# Patient Record
Sex: FEMALE | Race: OTHER | HISPANIC OR LATINO | URBAN - METROPOLITAN AREA
[De-identification: names, ages, dates, MRNs, and addresses within clinical notes are randomized per-mention and may not be internally consistent; named-entity substitution may affect disease eponyms.]

---

## 2016-12-20 RX ORDER — ESTROGENS, CONJUGATED 0.625 MG/G
0 CREAM WITH APPLICATOR VAGINAL
Qty: 30 | Refills: 0 | COMMUNITY
Start: 2016-12-20

## 2017-03-16 RX ORDER — FLUTICASONE PROPIONATE 50 MCG
1 SPRAY, SUSPENSION NASAL
Qty: 16 | Refills: 0 | COMMUNITY
Start: 2017-03-16

## 2017-03-16 RX ORDER — FLUTICASONE PROPIONATE 50 MCG
0 SPRAY, SUSPENSION NASAL
Qty: 16 | Refills: 0 | COMMUNITY
Start: 2017-03-16

## 2017-03-29 RX ORDER — FAMOTIDINE 10 MG/ML
1 INJECTION INTRAVENOUS
Qty: 12 | Refills: 0 | COMMUNITY
Start: 2017-03-29

## 2017-03-29 RX ORDER — FAMOTIDINE 10 MG/ML
0 INJECTION INTRAVENOUS
Qty: 12 | Refills: 0 | COMMUNITY
Start: 2017-03-29

## 2017-04-07 RX ORDER — VALSARTAN 80 MG/1
1 TABLET ORAL
Qty: 180 | Refills: 0 | COMMUNITY
Start: 2017-04-07

## 2017-04-07 RX ORDER — FLUTICASONE PROPIONATE AND SALMETEROL 50; 250 UG/1; UG/1
0 POWDER ORAL; RESPIRATORY (INHALATION)
Qty: 180 | Refills: 0 | COMMUNITY
Start: 2017-04-07

## 2017-04-07 RX ORDER — VALSARTAN 80 MG/1
0 TABLET ORAL
Qty: 180 | Refills: 0 | COMMUNITY
Start: 2017-04-07

## 2017-04-07 RX ORDER — FLUTICASONE PROPIONATE AND SALMETEROL 50; 250 UG/1; UG/1
1 POWDER ORAL; RESPIRATORY (INHALATION)
Qty: 180 | Refills: 0 | COMMUNITY
Start: 2017-04-07

## 2017-04-25 RX ORDER — ALBUTEROL 90 UG/1
0 AEROSOL, METERED ORAL
Qty: 75 | Refills: 0 | COMMUNITY
Start: 2017-04-25

## 2017-04-25 RX ORDER — ALBUTEROL 90 UG/1
3 AEROSOL, METERED ORAL
Qty: 75 | Refills: 0 | COMMUNITY
Start: 2017-04-25

## 2017-04-25 RX ORDER — TRIAMTERENE/HYDROCHLOROTHIAZID 75 MG-50MG
0 TABLET ORAL
Qty: 45 | Refills: 0 | COMMUNITY
Start: 2017-04-25

## 2017-05-01 RX ORDER — BUDESONIDE AND FORMOTEROL FUMARATE DIHYDRATE 160; 4.5 UG/1; UG/1
0 AEROSOL RESPIRATORY (INHALATION)
Qty: 10.2 | Refills: 0 | COMMUNITY
Start: 2017-05-01

## 2017-05-01 RX ORDER — BUDESONIDE AND FORMOTEROL FUMARATE DIHYDRATE 160; 4.5 UG/1; UG/1
2 AEROSOL RESPIRATORY (INHALATION)
Qty: 10.2 | Refills: 0 | COMMUNITY
Start: 2017-05-01

## 2017-05-01 RX ORDER — AMLODIPINE BESYLATE 2.5 MG/1
1 TABLET ORAL
Qty: 15 | Refills: 0 | COMMUNITY
Start: 2017-05-01

## 2017-05-01 RX ORDER — AMLODIPINE BESYLATE 2.5 MG/1
0 TABLET ORAL
Qty: 15 | Refills: 0 | COMMUNITY
Start: 2017-05-01

## 2017-05-22 ENCOUNTER — INPATIENT (INPATIENT)
Facility: HOSPITAL | Age: 82
LOS: 1 days | Discharge: HOME | DRG: 872 | End: 2017-05-24
Attending: INTERNAL MEDICINE | Admitting: INTERNAL MEDICINE
Payer: MEDICARE

## 2017-05-22 VITALS
DIASTOLIC BLOOD PRESSURE: 86 MMHG | TEMPERATURE: 100 F | OXYGEN SATURATION: 91 % | SYSTOLIC BLOOD PRESSURE: 179 MMHG | RESPIRATION RATE: 24 BRPM | HEART RATE: 98 BPM

## 2017-05-22 PROCEDURE — 99285 EMERGENCY DEPT VISIT HI MDM: CPT | Mod: GC

## 2017-05-22 RX ORDER — IPRATROPIUM/ALBUTEROL SULFATE 18-103MCG
3 AEROSOL WITH ADAPTER (GRAM) INHALATION
Qty: 0 | Refills: 0 | Status: COMPLETED | OUTPATIENT
Start: 2017-05-22 | End: 2017-05-23

## 2017-05-22 NOTE — ED ADULT NURSE NOTE - OBJECTIVE STATEMENT
82 y/o F, PMH HTN, asthma, never has been intubated, presents to ED c/o wet non-productive cough, fevers and SOB x 2 days, with worsening dyspnea tonight that prompted her to present to ED with son. Pt 90% on RA in Triage, brought back to Triage immediately, pt tachypneic, RR 24, respirations labored, pt placed on 4L nasal cannula, respirations improving, appearing less labored at this time. Pt denies headache, dizziness, chest pain, palpitations, abdominal pain, n/v/d, urinary symptoms at this time. Pt febrile 102.5F rectally, MD aware, pt last took Tylenol at 1600, pt medicated as ordered. Safety maintained. 82 y/o F, PMH HTN, asthma, never has been intubated, presents to ED c/o wet non-productive cough, fevers and SOB x 2 days, with worsening dyspnea tonight that prompted her to present to ED with son. Pt 90% on RA in Triage, brought back to Triage immediately, pt tachypneic, RR 24, respirations labored, pt placed on 4L nasal cannula, respirations improving, appearing less labored at this time. Pt denies headache, dizziness, chest pain, palpitations, abdominal pain, n/v/d, urinary symptoms at this time. Pt febrile 102.5F rectally, MD aware, pt last took Tylenol at 1600, pt medicated as ordered. No recent travel, no sick contacts. Safety maintained.

## 2017-05-23 DIAGNOSIS — I10 ESSENTIAL (PRIMARY) HYPERTENSION: ICD-10-CM

## 2017-05-23 DIAGNOSIS — J44.1 CHRONIC OBSTRUCTIVE PULMONARY DISEASE WITH (ACUTE) EXACERBATION: ICD-10-CM

## 2017-05-23 DIAGNOSIS — J18.9 PNEUMONIA, UNSPECIFIED ORGANISM: ICD-10-CM

## 2017-05-23 DIAGNOSIS — J96.91 RESPIRATORY FAILURE, UNSPECIFIED WITH HYPOXIA: ICD-10-CM

## 2017-05-23 DIAGNOSIS — Z29.9 ENCOUNTER FOR PROPHYLACTIC MEASURES, UNSPECIFIED: ICD-10-CM

## 2017-05-23 DIAGNOSIS — A41.9 SEPSIS, UNSPECIFIED ORGANISM: ICD-10-CM

## 2017-05-23 LAB
ALBUMIN SERPL ELPH-MCNC: 4.3 G/DL — SIGNIFICANT CHANGE UP (ref 3.3–5)
ALP SERPL-CCNC: 55 U/L — SIGNIFICANT CHANGE UP (ref 40–120)
ALT FLD-CCNC: 44 U/L RC — SIGNIFICANT CHANGE UP (ref 10–45)
ANION GAP SERPL CALC-SCNC: 14 MMOL/L — SIGNIFICANT CHANGE UP (ref 5–17)
APPEARANCE UR: CLEAR — SIGNIFICANT CHANGE UP
AST SERPL-CCNC: 39 U/L — SIGNIFICANT CHANGE UP (ref 10–40)
BASOPHILS # BLD AUTO: 0.1 K/UL — SIGNIFICANT CHANGE UP (ref 0–0.2)
BASOPHILS NFR BLD AUTO: 0.5 % — SIGNIFICANT CHANGE UP (ref 0–2)
BILIRUB SERPL-MCNC: 0.4 MG/DL — SIGNIFICANT CHANGE UP (ref 0.2–1.2)
BILIRUB UR-MCNC: NEGATIVE — SIGNIFICANT CHANGE UP
BUN SERPL-MCNC: 17 MG/DL — SIGNIFICANT CHANGE UP (ref 7–23)
CALCIUM SERPL-MCNC: 8.8 MG/DL — SIGNIFICANT CHANGE UP (ref 8.4–10.5)
CHLORIDE SERPL-SCNC: 95 MMOL/L — LOW (ref 96–108)
CO2 SERPL-SCNC: 27 MMOL/L — SIGNIFICANT CHANGE UP (ref 22–31)
COLOR SPEC: YELLOW — SIGNIFICANT CHANGE UP
CREAT SERPL-MCNC: 0.72 MG/DL — SIGNIFICANT CHANGE UP (ref 0.5–1.3)
DIFF PNL FLD: NEGATIVE — SIGNIFICANT CHANGE UP
EOSINOPHIL # BLD AUTO: 0 K/UL — SIGNIFICANT CHANGE UP (ref 0–0.5)
EOSINOPHIL NFR BLD AUTO: 0.3 % — SIGNIFICANT CHANGE UP (ref 0–6)
GAS PNL BLDV: SIGNIFICANT CHANGE UP
GLUCOSE SERPL-MCNC: 123 MG/DL — HIGH (ref 70–99)
GLUCOSE UR QL: NEGATIVE MG/DL — SIGNIFICANT CHANGE UP
HCT VFR BLD CALC: 43 % — SIGNIFICANT CHANGE UP (ref 34.5–45)
HGB BLD-MCNC: 14.6 G/DL — SIGNIFICANT CHANGE UP (ref 11.5–15.5)
KETONES UR-MCNC: NEGATIVE — SIGNIFICANT CHANGE UP
LEUKOCYTE ESTERASE UR-ACNC: NEGATIVE — SIGNIFICANT CHANGE UP
LYMPHOCYTES # BLD AUTO: 1.5 K/UL — SIGNIFICANT CHANGE UP (ref 1–3.3)
LYMPHOCYTES # BLD AUTO: 12.9 % — LOW (ref 13–44)
MCHC RBC-ENTMCNC: 33.1 PG — SIGNIFICANT CHANGE UP (ref 27–34)
MCHC RBC-ENTMCNC: 34 GM/DL — SIGNIFICANT CHANGE UP (ref 32–36)
MCV RBC AUTO: 97.4 FL — SIGNIFICANT CHANGE UP (ref 80–100)
MONOCYTES # BLD AUTO: 0.7 K/UL — SIGNIFICANT CHANGE UP (ref 0–0.9)
MONOCYTES NFR BLD AUTO: 6 % — SIGNIFICANT CHANGE UP (ref 2–14)
NEUTROPHILS # BLD AUTO: 9.2 K/UL — HIGH (ref 1.8–7.4)
NEUTROPHILS NFR BLD AUTO: 80.3 % — HIGH (ref 43–77)
NITRITE UR-MCNC: NEGATIVE — SIGNIFICANT CHANGE UP
PH UR: 6.5 — SIGNIFICANT CHANGE UP (ref 5–8)
PLATELET # BLD AUTO: 178 K/UL — SIGNIFICANT CHANGE UP (ref 150–400)
POTASSIUM SERPL-MCNC: 3.7 MMOL/L — SIGNIFICANT CHANGE UP (ref 3.5–5.3)
POTASSIUM SERPL-SCNC: 3.7 MMOL/L — SIGNIFICANT CHANGE UP (ref 3.5–5.3)
PROT SERPL-MCNC: 7.4 G/DL — SIGNIFICANT CHANGE UP (ref 6–8.3)
PROT UR-MCNC: NEGATIVE MG/DL — SIGNIFICANT CHANGE UP
RAPID RVP RESULT: SIGNIFICANT CHANGE UP
RBC # BLD: 4.42 M/UL — SIGNIFICANT CHANGE UP (ref 3.8–5.2)
RBC # FLD: 11.7 % — SIGNIFICANT CHANGE UP (ref 10.3–14.5)
SODIUM SERPL-SCNC: 136 MMOL/L — SIGNIFICANT CHANGE UP (ref 135–145)
SP GR SPEC: 1 — LOW (ref 1.01–1.02)
UROBILINOGEN FLD QL: NEGATIVE MG/DL — SIGNIFICANT CHANGE UP
WBC # BLD: 11.4 K/UL — HIGH (ref 3.8–10.5)
WBC # FLD AUTO: 11.4 K/UL — HIGH (ref 3.8–10.5)

## 2017-05-23 PROCEDURE — 99223 1ST HOSP IP/OBS HIGH 75: CPT | Mod: GC

## 2017-05-23 PROCEDURE — 71010: CPT | Mod: 26

## 2017-05-23 PROCEDURE — 12345: CPT | Mod: GC,NC

## 2017-05-23 RX ORDER — TIOTROPIUM BROMIDE 18 UG/1
1 CAPSULE ORAL; RESPIRATORY (INHALATION) DAILY
Qty: 0 | Refills: 0 | Status: DISCONTINUED | OUTPATIENT
Start: 2017-05-23 | End: 2017-05-23

## 2017-05-23 RX ORDER — ENOXAPARIN SODIUM 100 MG/ML
40 INJECTION SUBCUTANEOUS DAILY
Qty: 0 | Refills: 0 | Status: DISCONTINUED | OUTPATIENT
Start: 2017-05-23 | End: 2017-05-24

## 2017-05-23 RX ORDER — ALBUTEROL 90 UG/1
1 AEROSOL, METERED ORAL EVERY 4 HOURS
Qty: 0 | Refills: 0 | Status: DISCONTINUED | OUTPATIENT
Start: 2017-05-23 | End: 2017-05-23

## 2017-05-23 RX ORDER — HEPARIN SODIUM 5000 [USP'U]/ML
5000 INJECTION INTRAVENOUS; SUBCUTANEOUS EVERY 8 HOURS
Qty: 0 | Refills: 0 | Status: DISCONTINUED | OUTPATIENT
Start: 2017-05-23 | End: 2017-05-23

## 2017-05-23 RX ORDER — ALBUTEROL 90 UG/1
2 AEROSOL, METERED ORAL EVERY 6 HOURS
Qty: 0 | Refills: 0 | Status: DISCONTINUED | OUTPATIENT
Start: 2017-05-23 | End: 2017-05-23

## 2017-05-23 RX ORDER — IPRATROPIUM/ALBUTEROL SULFATE 18-103MCG
3 AEROSOL WITH ADAPTER (GRAM) INHALATION EVERY 6 HOURS
Qty: 0 | Refills: 0 | Status: DISCONTINUED | OUTPATIENT
Start: 2017-05-23 | End: 2017-05-23

## 2017-05-23 RX ORDER — FLUTICASONE PROPIONATE 50 MCG
1 SPRAY, SUSPENSION NASAL DAILY
Qty: 0 | Refills: 0 | Status: DISCONTINUED | OUTPATIENT
Start: 2017-05-23 | End: 2017-05-24

## 2017-05-23 RX ORDER — ACETAMINOPHEN 500 MG
1000 TABLET ORAL ONCE
Qty: 0 | Refills: 0 | Status: COMPLETED | OUTPATIENT
Start: 2017-05-23 | End: 2017-05-23

## 2017-05-23 RX ORDER — IPRATROPIUM/ALBUTEROL SULFATE 18-103MCG
3 AEROSOL WITH ADAPTER (GRAM) INHALATION EVERY 6 HOURS
Qty: 0 | Refills: 0 | Status: DISCONTINUED | OUTPATIENT
Start: 2017-05-23 | End: 2017-05-24

## 2017-05-23 RX ORDER — BUDESONIDE AND FORMOTEROL FUMARATE DIHYDRATE 160; 4.5 UG/1; UG/1
1 AEROSOL RESPIRATORY (INHALATION)
Qty: 0 | Refills: 0 | Status: DISCONTINUED | OUTPATIENT
Start: 2017-05-23 | End: 2017-05-23

## 2017-05-23 RX ADMIN — Medication 40 MILLIGRAM(S): at 05:59

## 2017-05-23 RX ADMIN — Medication 3 MILLILITER(S): at 23:44

## 2017-05-23 RX ADMIN — Medication 3 MILLILITER(S): at 00:00

## 2017-05-23 RX ADMIN — Medication 3 MILLILITER(S): at 15:32

## 2017-05-23 RX ADMIN — Medication 400 MILLIGRAM(S): at 00:06

## 2017-05-23 RX ADMIN — ENOXAPARIN SODIUM 40 MILLIGRAM(S): 100 INJECTION SUBCUTANEOUS at 15:32

## 2017-05-23 RX ADMIN — Medication 0.2 MILLIGRAM(S): at 15:33

## 2017-05-23 RX ADMIN — Medication 125 MILLIGRAM(S): at 00:07

## 2017-05-23 RX ADMIN — Medication 3 MILLILITER(S): at 00:05

## 2017-05-23 RX ADMIN — Medication 3 MILLILITER(S): at 05:59

## 2017-05-23 RX ADMIN — Medication 3 MILLILITER(S): at 00:07

## 2017-05-23 RX ADMIN — Medication 3 MILLILITER(S): at 16:54

## 2017-05-23 RX ADMIN — Medication 40 MILLIGRAM(S): at 15:33

## 2017-05-23 NOTE — H&P ADULT. - NEGATIVE ENMT SYMPTOMS
no throat pain/no sinus symptoms/no dry mouth/no nasal congestion/no dysphagia/no nasal obstruction/no hearing difficulty

## 2017-05-23 NOTE — H&P ADULT. - HISTORY OF PRESENT ILLNESS
80 yo F with pmhx of COPD, Asthma, HTN, presents to ED with 2 day history of cough and shortness of breath. Cough is non productive. Did not check temp, but felt febrile with chills.  Patient was not on any recent antibiotics. Patient is taking Prednisone 20 mg daily at home. She denies any recent sick contacts, no recent travel. No runny nose, watery eyes or sore throat. Patient also denies any diarrhea, constipation, or dysuria.  Patient also mentions she ran out of her inhalers two days ago.  Initially in the ED she had labored breathing and was placed on NC 4L and improved. She was then weaned down to 2L NC, and saturating well.  Patient was febrile in the ED to 102.5 and hypertensive.  She received Solumedrol, Levaquin 500 mg, Tylenol, and duoneb treatments. 82 yo Kazakh speaking F with pmhx of COPD vs Asthma, HTN, presents to ED with cough and 2 day worsening of shortness of breath. Cough is non productive. Did not check temp, but felt febrile with chills.  Patient was not on any recent antibiotics. Patient is taking Prednisone 20 mg daily at home. She denies any recent sick contacts, no recent travel. No runny nose, watery eyes or sore throat. Patient also denies any diarrhea, constipation, or dysuria.  Patient also mentions she ran out of her inhalers two days ago. Pt states she's actually been SOB for a long time, but one of her inhalers didn't work 3 weeks ago.    Initially in the ED she had labored breathing and was placed on NC 4L and improved. She was then weaned down to 2L NC, and saturating well.  Patient was febrile in the ED to 102.5 and hypertensive. She received Solumedrol, Levaquin 500 mg, Tylenol, and duoneb treatments.

## 2017-05-23 NOTE — ED ADULT NURSE REASSESSMENT NOTE - NS ED NURSE REASSESS COMMENT FT1
Pt AAOx3, NAD, resp nonlabored, no tachypnea, resting comfortably in bed. Pt reports improvement in cough and fever. Pt now denies SOB. SpO2 98% on 2L nasal cannula. Pt denies headache, dizziness, chest pain, palpitations, SOB, abd pain, n/v/d, urinary symptoms, fevers, chills, weakness at this time. Pt awaiting bed Safety maintained. Pt AAOx3, NAD, resp nonlabored, no tachypnea, resting comfortably in bed. Pt reports improvement in cough and fever. Pt now denies SOB. SpO2 98% on 2L nasal cannula. Pt 100F rectally, MD Craig made aware, no medicated needed at this time as per MD. ED Holding RN made aware in report. Pt denies headache, dizziness, chest pain, palpitations, SOB, abd pain, n/v/d, urinary symptoms, chills, weakness at this time. Pt awaiting bed Safety maintained.    0325:Report given to ED Holding JESSENIA Dawkins.

## 2017-05-23 NOTE — ED PROVIDER NOTE - MEDICAL DECISION MAKING DETAILS
Dr. Lazo (Attending Physician)  Pt. with ho asthma/copd pw fever, cough, congestion shortness of breath. O2 sat 80s on RA.  on exam wheezing diffusely and tachypneic febrile to 102.5.  Will give nebs, steroids, levofloxacin and check labs, blood cultures, RVP.  ECG.  Do not suspect CHF or heart failure.  If ECG abnormal or CXR reveals evidence of pulm edema will add on BNP and Trop.

## 2017-05-23 NOTE — H&P ADULT. - ASSESSMENT
80 yo F with COPD/Asthma, HTN, who presents for asthma excacerbation 82 yo F with COPD/Asthma, HTN, who presents for asthma excacerbation and sepsis 2/2 presumed CAP.

## 2017-05-23 NOTE — H&P ADULT. - PROBLEM SELECTOR PLAN 3
Heparin sub q ED document says patient had spO2 of 80 on room air.   Continue NC oxygen as needed  Likely 2/2 asthma exacerbation ED document says patient had spO2 of 80 on room air.   Continue NC oxygen as needed  Likely 2/2 asthma exacerbation  - cont NC

## 2017-05-23 NOTE — H&P ADULT. - PROBLEM SELECTOR PLAN 2
Continue PTA Clonidine 0.2 mg bid  Continue PTA Valsartan and other medications as patient's BP starts to come up. - Likely viral  - EKG Unchanged  - Continue Levaquin 500 mg daily for 5 days

## 2017-05-23 NOTE — ED PROVIDER NOTE - ATTENDING CONTRIBUTION TO CARE
Dr. Lazo (Attending Physician)  I performed a history and physical exam of the patient and discussed their management with the resident. I reviewed the resident's note and agree with the documented findings and plan of care. My medical decision making and observations are found above. Dr. Lazo (Attending Physician)  I performed a history and physical exam of the patient and discussed their management with the resident. I reviewed the resident's note and agree with the documented findings and plan of care. My medical decision making and observations are found above..

## 2017-05-23 NOTE — H&P ADULT. - NEUROLOGICAL DETAILS
sensation intact/alert and oriented x 3/normal strength sensation intact/cranial nerves intact/normal strength/responds to verbal commands/alert and oriented x 3

## 2017-05-23 NOTE — ED PROVIDER NOTE - OBJECTIVE STATEMENT
81yF h/o asthma (no prior intubations), former smoker, presents with 2 days of cough, shortness of breath and fever. No recent hospitalizations. No recent travel or sick contacts. No relief with albuterol at home.

## 2017-05-23 NOTE — H&P ADULT. - PROBLEM SELECTOR PLAN 1
Likely secondary to running out of her home inhalers or a viral pneumonia. No evidence this is bacterial with little of productive sputum. May be allergen triggered as well.    Continue Prednisone 40 mg daily for 5 days  Continue Levaquin 500 mg daily for 5 days  Continue duonebs and symbicort Likely secondary to running out of her home inhalers or a viral pneumonia. No evidence this is bacterial with little of productive sputum. May be allergen triggered as well.    Continue Solumedrol 20 mg IV BID  Continue Levaquin 500 mg daily for 5 days  Continue duonebs and symbicort Likely secondary to running out of her home inhalers or pneumonia. No evidence this is bacterial with little of productive sputum. May be allergen triggered as well.    Continue Solumedrol 20 mg IV BID  Continue Levaquin 500 mg daily for 5 days  Continue duonebs and symbicort

## 2017-05-23 NOTE — H&P ADULT. - EKG AND INTERPRETATION
Personally reviewed by me. Patient has unchanged EKG changes from 2015 .  Unchanged ST elevation sin V1,2,4,5  HR 90s; no other ST or T wave changes.

## 2017-05-23 NOTE — H&P ADULT. - RADIOLOGY RESULTS AND INTERPRETATION
Personally reviewed by me. Clear lungs on chest xray Personally reviewed by me. Clear lungs on chest xray. ?hyperinflated.

## 2017-05-23 NOTE — H&P ADULT. - PROBLEM SELECTOR PLAN 5
Continue PTA Clonidine 0.2 mg bid  Restart PTA Valsartan and other medications as patient's BP starts to come up.

## 2017-05-24 VITALS
DIASTOLIC BLOOD PRESSURE: 68 MMHG | HEART RATE: 77 BPM | TEMPERATURE: 99 F | RESPIRATION RATE: 18 BRPM | OXYGEN SATURATION: 92 % | SYSTOLIC BLOOD PRESSURE: 114 MMHG

## 2017-05-24 LAB
ANION GAP SERPL CALC-SCNC: 19 MMOL/L — HIGH (ref 5–17)
BASOPHILS # BLD AUTO: 0 K/UL — SIGNIFICANT CHANGE UP (ref 0–0.2)
BASOPHILS NFR BLD AUTO: 0 % — SIGNIFICANT CHANGE UP (ref 0–2)
BUN SERPL-MCNC: 19 MG/DL — SIGNIFICANT CHANGE UP (ref 7–23)
CALCIUM SERPL-MCNC: 9.1 MG/DL — SIGNIFICANT CHANGE UP (ref 8.4–10.5)
CHLORIDE SERPL-SCNC: 99 MMOL/L — SIGNIFICANT CHANGE UP (ref 96–108)
CO2 SERPL-SCNC: 21 MMOL/L — LOW (ref 22–31)
CREAT SERPL-MCNC: 0.59 MG/DL — SIGNIFICANT CHANGE UP (ref 0.5–1.3)
CULTURE RESULTS: NO GROWTH — SIGNIFICANT CHANGE UP
EOSINOPHIL # BLD AUTO: 0 K/UL — SIGNIFICANT CHANGE UP (ref 0–0.5)
EOSINOPHIL NFR BLD AUTO: 0 % — SIGNIFICANT CHANGE UP (ref 0–6)
GLUCOSE SERPL-MCNC: 110 MG/DL — HIGH (ref 70–99)
HCT VFR BLD CALC: 40.2 % — SIGNIFICANT CHANGE UP (ref 34.5–45)
HGB BLD-MCNC: 13.2 G/DL — SIGNIFICANT CHANGE UP (ref 11.5–15.5)
IMM GRANULOCYTES NFR BLD AUTO: 0.3 % — SIGNIFICANT CHANGE UP (ref 0–1.5)
LYMPHOCYTES # BLD AUTO: 1.23 K/UL — SIGNIFICANT CHANGE UP (ref 1–3.3)
LYMPHOCYTES # BLD AUTO: 10.5 % — LOW (ref 13–44)
MCHC RBC-ENTMCNC: 31 PG — SIGNIFICANT CHANGE UP (ref 27–34)
MCHC RBC-ENTMCNC: 32.8 GM/DL — SIGNIFICANT CHANGE UP (ref 32–36)
MCV RBC AUTO: 94.4 FL — SIGNIFICANT CHANGE UP (ref 80–100)
MONOCYTES # BLD AUTO: 0.5 K/UL — SIGNIFICANT CHANGE UP (ref 0–0.9)
MONOCYTES NFR BLD AUTO: 4.2 % — SIGNIFICANT CHANGE UP (ref 2–14)
NEUTROPHILS # BLD AUTO: 10.01 K/UL — HIGH (ref 1.8–7.4)
NEUTROPHILS NFR BLD AUTO: 85 % — HIGH (ref 43–77)
PLATELET # BLD AUTO: 195 K/UL — SIGNIFICANT CHANGE UP (ref 150–400)
POTASSIUM SERPL-MCNC: 4.5 MMOL/L — SIGNIFICANT CHANGE UP (ref 3.5–5.3)
POTASSIUM SERPL-SCNC: 4.5 MMOL/L — SIGNIFICANT CHANGE UP (ref 3.5–5.3)
RBC # BLD: 4.26 M/UL — SIGNIFICANT CHANGE UP (ref 3.8–5.2)
RBC # FLD: 13.3 % — SIGNIFICANT CHANGE UP (ref 10.3–14.5)
SODIUM SERPL-SCNC: 139 MMOL/L — SIGNIFICANT CHANGE UP (ref 135–145)
SPECIMEN SOURCE: SIGNIFICANT CHANGE UP
WBC # BLD: 11.77 K/UL — HIGH (ref 3.8–10.5)
WBC # FLD AUTO: 11.77 K/UL — HIGH (ref 3.8–10.5)

## 2017-05-24 PROCEDURE — 87798 DETECT AGENT NOS DNA AMP: CPT

## 2017-05-24 PROCEDURE — 84295 ASSAY OF SERUM SODIUM: CPT

## 2017-05-24 PROCEDURE — 87040 BLOOD CULTURE FOR BACTERIA: CPT

## 2017-05-24 PROCEDURE — 84132 ASSAY OF SERUM POTASSIUM: CPT

## 2017-05-24 PROCEDURE — 82803 BLOOD GASES ANY COMBINATION: CPT

## 2017-05-24 PROCEDURE — 87086 URINE CULTURE/COLONY COUNT: CPT

## 2017-05-24 PROCEDURE — 80048 BASIC METABOLIC PNL TOTAL CA: CPT

## 2017-05-24 PROCEDURE — 93005 ELECTROCARDIOGRAM TRACING: CPT

## 2017-05-24 PROCEDURE — 85027 COMPLETE CBC AUTOMATED: CPT

## 2017-05-24 PROCEDURE — 87581 M.PNEUMON DNA AMP PROBE: CPT

## 2017-05-24 PROCEDURE — 83605 ASSAY OF LACTIC ACID: CPT

## 2017-05-24 PROCEDURE — 85014 HEMATOCRIT: CPT

## 2017-05-24 PROCEDURE — 80053 COMPREHEN METABOLIC PANEL: CPT

## 2017-05-24 PROCEDURE — 82435 ASSAY OF BLOOD CHLORIDE: CPT

## 2017-05-24 PROCEDURE — 81003 URINALYSIS AUTO W/O SCOPE: CPT

## 2017-05-24 PROCEDURE — 71045 X-RAY EXAM CHEST 1 VIEW: CPT

## 2017-05-24 PROCEDURE — 99239 HOSP IP/OBS DSCHRG MGMT >30: CPT

## 2017-05-24 PROCEDURE — 87633 RESP VIRUS 12-25 TARGETS: CPT

## 2017-05-24 PROCEDURE — 96375 TX/PRO/DX INJ NEW DRUG ADDON: CPT

## 2017-05-24 PROCEDURE — 87486 CHLMYD PNEUM DNA AMP PROBE: CPT

## 2017-05-24 PROCEDURE — 94640 AIRWAY INHALATION TREATMENT: CPT

## 2017-05-24 PROCEDURE — 96374 THER/PROPH/DIAG INJ IV PUSH: CPT

## 2017-05-24 PROCEDURE — 82947 ASSAY GLUCOSE BLOOD QUANT: CPT

## 2017-05-24 PROCEDURE — 82330 ASSAY OF CALCIUM: CPT

## 2017-05-24 PROCEDURE — 99285 EMERGENCY DEPT VISIT HI MDM: CPT | Mod: 25

## 2017-05-24 RX ORDER — ALBUTEROL 90 UG/1
2 AEROSOL, METERED ORAL EVERY 6 HOURS
Qty: 0 | Refills: 0 | Status: DISCONTINUED | OUTPATIENT
Start: 2017-05-24 | End: 2017-05-24

## 2017-05-24 RX ORDER — BUDESONIDE AND FORMOTEROL FUMARATE DIHYDRATE 160; 4.5 UG/1; UG/1
2 AEROSOL RESPIRATORY (INHALATION)
Qty: 10.2 | Refills: 0 | OUTPATIENT
Start: 2017-05-24

## 2017-05-24 RX ORDER — FLUTICASONE PROPIONATE AND SALMETEROL 50; 250 UG/1; UG/1
1 POWDER ORAL; RESPIRATORY (INHALATION)
Qty: 180 | Refills: 0 | OUTPATIENT
Start: 2017-05-24

## 2017-05-24 RX ORDER — ACLIDINIUM BROMIDE 400 UG/1
1 POWDER, METERED RESPIRATORY (INHALATION)
Qty: 1 | Refills: 0 | OUTPATIENT
Start: 2017-05-24

## 2017-05-24 RX ORDER — AMLODIPINE BESYLATE 2.5 MG/1
1 TABLET ORAL
Qty: 30 | Refills: 0 | OUTPATIENT
Start: 2017-05-24 | End: 2017-06-23

## 2017-05-24 RX ORDER — BUDESONIDE AND FORMOTEROL FUMARATE DIHYDRATE 160; 4.5 UG/1; UG/1
1 AEROSOL RESPIRATORY (INHALATION)
Qty: 0 | Refills: 0 | Status: DISCONTINUED | OUTPATIENT
Start: 2017-05-24 | End: 2017-05-24

## 2017-05-24 RX ORDER — ACLIDINIUM BROMIDE 400 UG/1
1 POWDER, METERED RESPIRATORY (INHALATION)
Qty: 0 | Refills: 0 | COMMUNITY

## 2017-05-24 RX ORDER — BUDESONIDE AND FORMOTEROL FUMARATE DIHYDRATE 160; 4.5 UG/1; UG/1
2 AEROSOL RESPIRATORY (INHALATION)
Qty: 0 | Refills: 0 | Status: DISCONTINUED | OUTPATIENT
Start: 2017-05-24 | End: 2017-05-24

## 2017-05-24 RX ORDER — ALBUTEROL 90 UG/1
2 AEROSOL, METERED ORAL
Qty: 1 | Refills: 0 | OUTPATIENT
Start: 2017-05-24

## 2017-05-24 RX ADMIN — Medication 0.2 MILLIGRAM(S): at 05:48

## 2017-05-24 RX ADMIN — Medication 3 MILLILITER(S): at 11:24

## 2017-05-24 RX ADMIN — Medication 3 MILLILITER(S): at 05:49

## 2017-05-24 RX ADMIN — Medication 40 MILLIGRAM(S): at 05:49

## 2017-05-24 RX ADMIN — ENOXAPARIN SODIUM 40 MILLIGRAM(S): 100 INJECTION SUBCUTANEOUS at 11:24

## 2017-05-24 NOTE — DISCHARGE NOTE ADULT - CARE PLAN
Principal Discharge DX:	COPD exacerbation  Goal:	Complete medication course and follow up with your doctors  Instructions for follow-up, activity and diet:	You presented with a possible COPD exacerbation, you were treated with steroids and antibiotics.  Please complete with oral prednisone 40mg daily for three more days and oral Levofloxacin 500mg for three more days.  I spoke with Dr. Tahir Haley and he recommended that you go see a lung specialist, his name is Yordy Berry and you can make an appointment with him at [phone].  I have also sent refills for your inhaler to [facility], please take them as directed. Principal Discharge DX:	COPD exacerbation  Goal:	Complete medication course and follow up with your doctors  Instructions for follow-up, activity and diet:	You presented with a possible COPD exacerbation, you were treated with steroids and antibiotics.  Please complete with oral prednisone 40mg daily for three more days and oral Levofloxacin 500mg for three more days.  I spoke with Dr. Tahir Haley and he recommended that you go see a lung specialist, his name is Yordy Berry and you can make an appointment with him at [phone].  I have also sent refills for your inhaler to [employer] [facility], please take them as directed. Principal Discharge DX:	COPD exacerbation  Goal:	Complete medication course and follow up with your doctors  Instructions for follow-up, activity and diet:	You presented with a possible COPD exacerbation, you were treated with steroids and antibiotics.  Please complete with oral prednisone 40mg daily for three more days and oral Levofloxacin 500mg for three more days.  I spoke with Dr. Tahir Haley and he recommended that you go see a lung specialist, his name is Yordy Berry and you can make an appointment with him at [phone].  I have also sent refills for your inhaler to [facility], please take them as directed.

## 2017-05-24 NOTE — DISCHARGE NOTE ADULT - MEDICATION SUMMARY - MEDICATIONS TO STOP TAKING
I will STOP taking the medications listed below when I get home from the hospital:    PREDNISONE   TAB 20MG    TRIAMT/HCTZ  CAP 37.5-25    VALSARTAN    TAB 40MG    triamterene-hydrochlorothiazide 37.5mg-25mg oral capsule  -- 1 cap(s) by mouth 2 times a week on monday and friday    valsartan 40 mg oral tablet  -- 1 tab(s) by mouth 2 times a day

## 2017-05-24 NOTE — DISCHARGE NOTE ADULT - PATIENT PORTAL LINK FT
“You can access the [facility] Patient Portal, offered by [facility], by registering with the following website: [email or web address]”

## 2017-05-24 NOTE — DISCHARGE NOTE ADULT - PLAN OF CARE
Complete medication course and follow up with your doctors You presented with a possible COPD exacerbation, you were treated with steroids and antibiotics.  Please complete with oral prednisone 40mg daily for three more days and oral Levofloxacin 500mg for three more days.  I spoke with Dr. Tahir Haley and he recommended that you go see a lung specialist, his name is Yordy Berry and you can make an appointment with him at [phone].  I have also sent refills for your inhaler to [email or web address], please take them as directed.

## 2017-05-24 NOTE — DISCHARGE NOTE ADULT - MEDICATION SUMMARY - MEDICATIONS TO TAKE
I will START or STAY ON the medications listed below when I get home from the hospital:    predniSONE 20 mg oral tablet  -- 2 tab(s) by mouth once a day for three days  -- Indication: For COPD exacerbation    cloNIDine 0.2 mg oral tablet  -- 1 tab(s) by mouth once a day  -- Indication: For Hypertension    albuterol 90 mcg/inh inhalation aerosol  -- 2 puff(s) inhaled every 6 hours, AS NEEDED, Shortness of Breath and/or Wheezing MDD:DISPENSE ONE INHALER  -- Indication: For Asthma    Tudorza Pressair 400 mcg/inh inhalation powder  -- 1 inhaler(s) inhaled once a day MDD:DISPENSE ONE INHALER  -- Indication: For COPD exacerbation    Symbicort 160 mcg-4.5 mcg/inh inhalation aerosol  -- 2 puff(s) inhaled 2 times a day MDD:DISPENSE ONE INHALER  -- Indication: For COPD exacerbation    Advair Diskus 250 mcg-50 mcg inhalation powder  -- 1 puff(s) inhaled 2 times a day MDD:DISPENSE ONE INHALER  -- Indication: For COPD exacerbation    amLODIPine 5 mg oral tablet  -- 1 tab(s) by mouth once a day  -- Indication: For HTN (hypertension)    famotidine 20 mg oral tablet  -- 1 tab(s) by mouth 2 times a day  -- Indication: For GERD    fluticasone 50 mcg/inh nasal spray  -- 1 spray(s) into nose once a day  -- Indication: For Asthma    levoFLOXacin 500 mg oral tablet  -- 1 tab(s) by mouth once a day for three days  -- Avoid prolonged or excessive exposure to direct and/or artificial sunlight while taking this medication.  Do not take dairy products, antacids, or iron preparations within one hour of this medication.  Finish all this medication unless otherwise directed by prescriber.  May cause drowsiness or dizziness.  Medication should be taken with plenty of water.    -- Indication: For COPD exacerbation

## 2017-05-24 NOTE — DISCHARGE NOTE ADULT - CARE PROVIDER_API CALL
Tahir Haley), Surgery  [address] [place] [address]  Phone: [phone]  Fax: [phone]    Yordy Berry), Critical Care Medicine; Internal Medicine; Pulmonary Disease  [address] [facility] [address] [place] [id number]  Phone: [phone]  Fax: [phone]

## 2017-05-24 NOTE — DISCHARGE NOTE ADULT - HOSPITAL COURSE
81yoF PMH asthma and HTN p/w fever chills cough and COB x 2 days, presumed COPD exacerbation despite lack of formal diagnosis.  Was treated in ED with IV Levaquin and IV solumedrol 125mg.  Pt was admitted for observation and serial exams.  On day one she was very wheezy diffusely and so she was continued on PO Prednisone 40qD planned for 5 days total and IV Levaquin planned for transition to PO and planned for 5 days total.  Pt insisted on going home AM of 5/24 for family obligations, her ambulatory O2 sat on RA was over 90% and pt was breathing comfortably.  PCP Dr. Tahir Haley was called and he requested that patient follow up with pulmonary Dr. Yordy Berry, pt was provided with the office information and reminded to make an appointment.  Pt son was also called and plan was agreed upon.  Pt was d/c home and encouraged to make PCP and Pulm follow ups. 81yoF PMH asthma and HTN p/w fever chills cough and COB x 2 days, admitted with SEPSIS 2/2 uri complicated by presumed COPD exacerbation despite lack of formal diagnosis.  Was treated in ED with IV Levaquin and IV solumedrol 125mg.  Pt was admitted for observation and serial exams.  On day one she was very wheezy diffusely and so she was continued on PO Prednisone 40qD planned for 5 days total and IV Levaquin planned for transition to PO and planned for 5 days total.  Pt insisted on going home AM of 5/24 for family obligations, her ambulatory O2 sat on RA was over 90% and pt was breathing comfortably.  PCP Dr. Tahir Haley was called and he requested that patient follow up with pulmonary DrAnthony Berry, pt was provided with the office information and reminded to make an appointment.  Pt son was also called and plan was agreed upon.  Pt was d/c home and encouraged to make PCP and Pulm follow ups.

## 2017-05-28 LAB
CULTURE RESULTS: SIGNIFICANT CHANGE UP
CULTURE RESULTS: SIGNIFICANT CHANGE UP
SPECIMEN SOURCE: SIGNIFICANT CHANGE UP
SPECIMEN SOURCE: SIGNIFICANT CHANGE UP

## 2017-06-11 ENCOUNTER — INPATIENT (INPATIENT)
Facility: HOSPITAL | Age: 82
LOS: 0 days | Discharge: HOME | DRG: 194 | End: 2017-06-12
Attending: HOSPITALIST | Admitting: STUDENT IN AN ORGANIZED HEALTH CARE EDUCATION/TRAINING PROGRAM
Payer: MEDICARE

## 2017-06-11 VITALS
SYSTOLIC BLOOD PRESSURE: 158 MMHG | RESPIRATION RATE: 20 BRPM | HEART RATE: 96 BPM | OXYGEN SATURATION: 94 % | TEMPERATURE: 101 F | DIASTOLIC BLOOD PRESSURE: 72 MMHG

## 2017-06-11 DIAGNOSIS — J11.1 INFLUENZA DUE TO UNIDENTIFIED INFLUENZA VIRUS WITH OTHER RESPIRATORY MANIFESTATIONS: ICD-10-CM

## 2017-06-11 DIAGNOSIS — I10 ESSENTIAL (PRIMARY) HYPERTENSION: ICD-10-CM

## 2017-06-11 DIAGNOSIS — J44.1 CHRONIC OBSTRUCTIVE PULMONARY DISEASE WITH (ACUTE) EXACERBATION: ICD-10-CM

## 2017-06-11 DIAGNOSIS — Z79.899 OTHER LONG TERM (CURRENT) DRUG THERAPY: ICD-10-CM

## 2017-06-11 DIAGNOSIS — J10.1 INFLUENZA DUE TO OTHER IDENTIFIED INFLUENZA VIRUS WITH OTHER RESPIRATORY MANIFESTATIONS: ICD-10-CM

## 2017-06-11 DIAGNOSIS — Z29.9 ENCOUNTER FOR PROPHYLACTIC MEASURES, UNSPECIFIED: ICD-10-CM

## 2017-06-11 PROBLEM — J45.909 UNSPECIFIED ASTHMA, UNCOMPLICATED: Chronic | Status: ACTIVE | Noted: 2017-05-22

## 2017-06-11 LAB
ALBUMIN SERPL ELPH-MCNC: 3.9 G/DL — SIGNIFICANT CHANGE UP (ref 3.3–5)
ALP SERPL-CCNC: 48 U/L — SIGNIFICANT CHANGE UP (ref 40–120)
ALT FLD-CCNC: 35 U/L RC — SIGNIFICANT CHANGE UP (ref 10–45)
ANION GAP SERPL CALC-SCNC: 12 MMOL/L — SIGNIFICANT CHANGE UP (ref 5–17)
APPEARANCE UR: CLEAR — SIGNIFICANT CHANGE UP
AST SERPL-CCNC: 32 U/L — SIGNIFICANT CHANGE UP (ref 10–40)
BASOPHILS # BLD AUTO: 0.1 K/UL — SIGNIFICANT CHANGE UP (ref 0–0.2)
BASOPHILS NFR BLD AUTO: 1.3 % — SIGNIFICANT CHANGE UP (ref 0–2)
BILIRUB SERPL-MCNC: 0.3 MG/DL — SIGNIFICANT CHANGE UP (ref 0.2–1.2)
BILIRUB UR-MCNC: NEGATIVE — SIGNIFICANT CHANGE UP
BUN SERPL-MCNC: 11 MG/DL — SIGNIFICANT CHANGE UP (ref 7–23)
CALCIUM SERPL-MCNC: 8.9 MG/DL — SIGNIFICANT CHANGE UP (ref 8.4–10.5)
CHLORIDE SERPL-SCNC: 97 MMOL/L — SIGNIFICANT CHANGE UP (ref 96–108)
CO2 SERPL-SCNC: 26 MMOL/L — SIGNIFICANT CHANGE UP (ref 22–31)
COLOR SPEC: SIGNIFICANT CHANGE UP
CREAT SERPL-MCNC: 0.73 MG/DL — SIGNIFICANT CHANGE UP (ref 0.5–1.3)
DIFF PNL FLD: NEGATIVE — SIGNIFICANT CHANGE UP
EOSINOPHIL # BLD AUTO: 0 K/UL — SIGNIFICANT CHANGE UP (ref 0–0.5)
EOSINOPHIL NFR BLD AUTO: 0.1 % — SIGNIFICANT CHANGE UP (ref 0–6)
FLUBV RNA SPEC QL NAA+PROBE: DETECTED
GLUCOSE SERPL-MCNC: 100 MG/DL — HIGH (ref 70–99)
GLUCOSE UR QL: NEGATIVE — SIGNIFICANT CHANGE UP
HCT VFR BLD CALC: 41.9 % — SIGNIFICANT CHANGE UP (ref 34.5–45)
HGB BLD-MCNC: 13.6 G/DL — SIGNIFICANT CHANGE UP (ref 11.5–15.5)
KETONES UR-MCNC: NEGATIVE — SIGNIFICANT CHANGE UP
LEUKOCYTE ESTERASE UR-ACNC: ABNORMAL
LYMPHOCYTES # BLD AUTO: 1.3 K/UL — SIGNIFICANT CHANGE UP (ref 1–3.3)
LYMPHOCYTES # BLD AUTO: 30.2 % — SIGNIFICANT CHANGE UP (ref 13–44)
MCHC RBC-ENTMCNC: 31.6 PG — SIGNIFICANT CHANGE UP (ref 27–34)
MCHC RBC-ENTMCNC: 32.5 GM/DL — SIGNIFICANT CHANGE UP (ref 32–36)
MCV RBC AUTO: 97.3 FL — SIGNIFICANT CHANGE UP (ref 80–100)
MONOCYTES # BLD AUTO: 0.5 K/UL — SIGNIFICANT CHANGE UP (ref 0–0.9)
MONOCYTES NFR BLD AUTO: 11.8 % — SIGNIFICANT CHANGE UP (ref 2–14)
NEUTROPHILS # BLD AUTO: 2.4 K/UL — SIGNIFICANT CHANGE UP (ref 1.8–7.4)
NEUTROPHILS NFR BLD AUTO: 56.6 % — SIGNIFICANT CHANGE UP (ref 43–77)
NITRITE UR-MCNC: POSITIVE
NT-PROBNP SERPL-SCNC: 145 PG/ML — SIGNIFICANT CHANGE UP (ref 0–300)
PH UR: 7 — SIGNIFICANT CHANGE UP (ref 5–8)
PLATELET # BLD AUTO: 141 K/UL — LOW (ref 150–400)
POTASSIUM SERPL-MCNC: 4.6 MMOL/L — SIGNIFICANT CHANGE UP (ref 3.5–5.3)
POTASSIUM SERPL-SCNC: 4.6 MMOL/L — SIGNIFICANT CHANGE UP (ref 3.5–5.3)
PROT SERPL-MCNC: 7.1 G/DL — SIGNIFICANT CHANGE UP (ref 6–8.3)
PROT UR-MCNC: SIGNIFICANT CHANGE UP
RAPID RVP RESULT: DETECTED
RBC # BLD: 4.31 M/UL — SIGNIFICANT CHANGE UP (ref 3.8–5.2)
RBC # FLD: 11.9 % — SIGNIFICANT CHANGE UP (ref 10.3–14.5)
SODIUM SERPL-SCNC: 135 MMOL/L — SIGNIFICANT CHANGE UP (ref 135–145)
SP GR SPEC: 1.01 — SIGNIFICANT CHANGE UP (ref 1.01–1.02)
UROBILINOGEN FLD QL: NEGATIVE — SIGNIFICANT CHANGE UP
WBC # BLD: 4.3 K/UL — SIGNIFICANT CHANGE UP (ref 3.8–10.5)
WBC # FLD AUTO: 4.3 K/UL — SIGNIFICANT CHANGE UP (ref 3.8–10.5)

## 2017-06-11 PROCEDURE — 99285 EMERGENCY DEPT VISIT HI MDM: CPT | Mod: 25

## 2017-06-11 PROCEDURE — 71010: CPT | Mod: 26

## 2017-06-11 PROCEDURE — 99223 1ST HOSP IP/OBS HIGH 75: CPT | Mod: GC

## 2017-06-11 PROCEDURE — 93010 ELECTROCARDIOGRAM REPORT: CPT

## 2017-06-11 RX ORDER — ALBUTEROL 90 UG/1
2.5 AEROSOL, METERED ORAL ONCE
Qty: 0 | Refills: 0 | Status: COMPLETED | OUTPATIENT
Start: 2017-06-11 | End: 2017-06-11

## 2017-06-11 RX ORDER — AZITHROMYCIN 500 MG/1
250 TABLET, FILM COATED ORAL ONCE
Qty: 0 | Refills: 0 | Status: DISCONTINUED | OUTPATIENT
Start: 2017-06-11 | End: 2017-06-11

## 2017-06-11 RX ORDER — ACETAMINOPHEN 500 MG
975 TABLET ORAL ONCE
Qty: 0 | Refills: 0 | Status: COMPLETED | OUTPATIENT
Start: 2017-06-11 | End: 2017-06-11

## 2017-06-11 RX ORDER — HEPARIN SODIUM 5000 [USP'U]/ML
5000 INJECTION INTRAVENOUS; SUBCUTANEOUS EVERY 8 HOURS
Qty: 0 | Refills: 0 | Status: DISCONTINUED | OUTPATIENT
Start: 2017-06-11 | End: 2017-06-12

## 2017-06-11 RX ORDER — AZITHROMYCIN 500 MG/1
1 TABLET, FILM COATED ORAL
Qty: 5 | Refills: 0 | OUTPATIENT
Start: 2017-06-11 | End: 2017-06-16

## 2017-06-11 RX ORDER — IPRATROPIUM/ALBUTEROL SULFATE 18-103MCG
3 AEROSOL WITH ADAPTER (GRAM) INHALATION
Qty: 0 | Refills: 0 | Status: COMPLETED | OUTPATIENT
Start: 2017-06-11 | End: 2017-06-11

## 2017-06-11 RX ORDER — ACETAMINOPHEN 500 MG
650 TABLET ORAL EVERY 6 HOURS
Qty: 0 | Refills: 0 | Status: DISCONTINUED | OUTPATIENT
Start: 2017-06-11 | End: 2017-06-12

## 2017-06-11 RX ORDER — FAMOTIDINE 10 MG/ML
20 INJECTION INTRAVENOUS
Qty: 0 | Refills: 0 | Status: DISCONTINUED | OUTPATIENT
Start: 2017-06-11 | End: 2017-06-12

## 2017-06-11 RX ORDER — VALSARTAN 80 MG/1
40 TABLET ORAL DAILY
Qty: 0 | Refills: 0 | Status: DISCONTINUED | OUTPATIENT
Start: 2017-06-11 | End: 2017-06-12

## 2017-06-11 RX ORDER — IPRATROPIUM/ALBUTEROL SULFATE 18-103MCG
3 AEROSOL WITH ADAPTER (GRAM) INHALATION EVERY 6 HOURS
Qty: 0 | Refills: 0 | Status: DISCONTINUED | OUTPATIENT
Start: 2017-06-11 | End: 2017-06-12

## 2017-06-11 RX ORDER — AZITHROMYCIN 500 MG/1
500 TABLET, FILM COATED ORAL ONCE
Qty: 0 | Refills: 0 | Status: COMPLETED | OUTPATIENT
Start: 2017-06-11 | End: 2017-06-11

## 2017-06-11 RX ORDER — BUDESONIDE AND FORMOTEROL FUMARATE DIHYDRATE 160; 4.5 UG/1; UG/1
1 AEROSOL RESPIRATORY (INHALATION)
Qty: 0 | Refills: 0 | Status: DISCONTINUED | OUTPATIENT
Start: 2017-06-11 | End: 2017-06-12

## 2017-06-11 RX ORDER — FLUTICASONE PROPIONATE 50 MCG
1 SPRAY, SUSPENSION NASAL DAILY
Qty: 0 | Refills: 0 | Status: DISCONTINUED | OUTPATIENT
Start: 2017-06-11 | End: 2017-06-12

## 2017-06-11 RX ORDER — AMLODIPINE BESYLATE 2.5 MG/1
5 TABLET ORAL DAILY
Qty: 0 | Refills: 0 | Status: DISCONTINUED | OUTPATIENT
Start: 2017-06-11 | End: 2017-06-12

## 2017-06-11 RX ADMIN — Medication 75 MILLIGRAM(S): at 21:14

## 2017-06-11 RX ADMIN — BUDESONIDE AND FORMOTEROL FUMARATE DIHYDRATE 1 PUFF(S): 160; 4.5 AEROSOL RESPIRATORY (INHALATION) at 18:26

## 2017-06-11 RX ADMIN — Medication 975 MILLIGRAM(S): at 14:14

## 2017-06-11 RX ADMIN — Medication 3 MILLILITER(S): at 12:55

## 2017-06-11 RX ADMIN — ALBUTEROL 2.5 MILLIGRAM(S): 90 AEROSOL, METERED ORAL at 16:33

## 2017-06-11 RX ADMIN — Medication 3 MILLILITER(S): at 12:50

## 2017-06-11 RX ADMIN — Medication 3 MILLILITER(S): at 23:49

## 2017-06-11 RX ADMIN — Medication 80 MILLIGRAM(S): at 13:13

## 2017-06-11 RX ADMIN — ALBUTEROL 2.5 MILLIGRAM(S): 90 AEROSOL, METERED ORAL at 16:15

## 2017-06-11 RX ADMIN — AZITHROMYCIN 250 MILLIGRAM(S): 500 TABLET, FILM COATED ORAL at 15:55

## 2017-06-11 RX ADMIN — HEPARIN SODIUM 5000 UNIT(S): 5000 INJECTION INTRAVENOUS; SUBCUTANEOUS at 21:09

## 2017-06-11 RX ADMIN — Medication 975 MILLIGRAM(S): at 13:00

## 2017-06-11 RX ADMIN — ALBUTEROL 2.5 MILLIGRAM(S): 90 AEROSOL, METERED ORAL at 16:25

## 2017-06-11 RX ADMIN — Medication 3 MILLILITER(S): at 12:59

## 2017-06-11 RX ADMIN — Medication 1 SPRAY(S): at 21:15

## 2017-06-11 RX ADMIN — Medication 30 MILLIGRAM(S): at 15:30

## 2017-06-11 NOTE — H&P ADULT - PROBLEM SELECTOR PLAN 3
- given poor compliance to anti-hypertensives, most concerning of which is the clonidine, will spare down patient's blood pressure regimen to Losartan and Norvasc, and taper her off of clonidine  - will coordinate with outpatient providers to ensure safe discharge

## 2017-06-11 NOTE — ED ADULT NURSE REASSESSMENT NOTE - NS ED NURSE REASSESS COMMENT FT1
pt. reports feeling better, wheezing still noted in all lung fields. Oxygen sat 98% on 3L NC. Comfort and safety maintained. Nebs being administered as ordered. TBA.

## 2017-06-11 NOTE — H&P ADULT - PROBLEM SELECTOR PLAN 2
- continue with Prednisone 40mg x 5 days  - Azithromycin 500mg daily   - Duonebs as needed for dyspnea/bronchospasm

## 2017-06-11 NOTE — H&P ADULT - NSHPPHYSICALEXAM_GEN_ALL_CORE
GENERAL: No acute distress, well-developed  HEAD:  Atraumatic, Normocephalic  ENT: EOMI, PERRLA, conjunctiva and sclera clear, Neck supple, No JVD, moist mucosa, no pharynageal erythema, no tonsillar enlargement or exudate  CHEST/LUNG: Clear to auscultation bilaterally; No wheeze, equal breath sounds bilaterally   HEART: Regular rate and rhythm; No murmurs, rubs, or gallops  ABDOMEN: Soft, Nontender, Nondistended; Bowel sounds present, no organomegaly  EXTREMITIES:  No clubbing, cyanosis, or edema  PSYCH: Nl behavior, nl affect  NEUROLOGY: AAOx3, non-focal, cranial nerves intact  SKIN: Normal color, No rashes or lesions GENERAL: No acute distress, well-developed  HEAD:  Atraumatic, Normocephalic  ENT: EOMI, PERRLA, conjunctiva and sclera clear, Neck supple, No JVD, moist mucosa, no pharyngeal erythema, no tonsillar enlargement or exudate  CHEST/LUNG: bilateral expiratory wheezes/ronchi  HEART: tachycardic; No murmurs, rubs, or gallops  ABDOMEN: Soft, Nontender, Nondistended; Bowel sounds present, no organomegaly  EXTREMITIES:  No clubbing, cyanosis, or edema  PSYCH: Nl behavior, nl affect  NEUROLOGY: AAOx3, non-focal, cranial nerves intact  SKIN: Normal color, No rashes or lesions

## 2017-06-11 NOTE — ED PROVIDER NOTE - MEDICAL DECISION MAKING DETAILS
Attending MD Welch: 81F with COPD not on home O2 presenting with cough x several days, exam notable for diffuse wheezes and poor air entry in b/l lung fields, likely COPD exacerbation, will give bronchodilators, solumedrol, CXR to r/o concurrent infiltrate, and re-eval

## 2017-06-11 NOTE — ED PROCEDURE NOTE - PROCEDURE ADDITIONAL DETAILS
Emergency Department Focused Ultrasound performed at patient's bedside for educational purposes. The study will have a follow up study performed or was performed in the direct supervision of an ultrasound trained attending. Bilateral lung sliding, bilateral a lines, no b lines, lung bases poorly visualized, no signs of effusion or edema.

## 2017-06-11 NOTE — H&P ADULT - NSHPSOCIALHISTORY_GEN_ALL_CORE
Lives alone; independent with all ADL's; quit smoking > 30 years ago; does not drink or do any illicit drugs

## 2017-06-11 NOTE — ED PROVIDER NOTE - PROGRESS NOTE DETAILS
Attending MD Welch: improved but still with diffuse wheezes, will admit for COPD exacerbation vs CDU if RVP negative Patient feels better s/p 3 nebs and iv steroids, RVP + or influenza B, still with wheezes on exam, will give more nebs and seek admission. -Monica

## 2017-06-11 NOTE — ED PROVIDER NOTE - PHYSICAL EXAMINATION
Gen: NAD, AOx3  Head: NCAT  HEENT: PERRL, oral mucosa moist, normal conjunctiva  Lung: decreased sounds, bilateral wheezing, mild respiratory distress  CV: rrr, no murmurs, Normal perfusion  Abd: soft, NTND, no CVA tenderness  MSK: No edema, no visible deformities  Neuro: No focal neurologic deficits  Skin: No rash   Psych: normal affect

## 2017-06-11 NOTE — H&P ADULT - ATTENDING COMMENTS
82 yo woman with PMH of HTN and COPD vs asthma a/w SOB/wheezing/fever found to be flu B positive.  Will treat with tamiflu for flu and will treat for COPD exacerbation given wheezing/SOB with prednisone/nebs/levofloxacin.  Need to consolidate her anti-HTNives - will do just valsartan and norvasc and see how BP does.

## 2017-06-11 NOTE — H&P ADULT - NSHPLABSRESULTS_GEN_ALL_CORE
Labs reviewed, showing absence of leukocytosis (WBC 4.3, absence of neutrophil predominance/bandemia; mild thrombocytopenia; normal BMP; serum pro-;   UA: positive nitrites/small leukocyte esterase; 5-10 WBC; 2-5 RBC; few bacteria    Influenza B positive     135  |  97  |  11  ----------------------------<  100<H>  4.6   |  26  |  0.73    Ca    8.9      2017 13:24    TPro  7.1  /  Alb  3.9  /  TBili  0.3  /  DBili  x   /  AST  32  /  ALT  35  /  AlkPhos  48                              13.6   4.3   )-----------( 141      ( 2017 13:24 )             41.9             LIVER FUNCTIONS - ( 2017 13:24 )  Alb: 3.9 g/dL / Pro: 7.1 g/dL / ALK PHOS: 48 U/L / ALT: 35 U/L RC / AST: 32 U/L / GGT: x                 Urinalysis Basic - ( 2017 14:35 )    Color: PL Yellow / Appearance: Clear / S.012 / pH: x  Gluc: x / Ketone: Negative  / Bili: Negative / Urobili: Negative   Blood: x / Protein: Trace / Nitrite: Positive   Leuk Esterase: Small / RBC: 2-5 /HPF / WBC 5-10 /HPF   Sq Epi: x / Non Sq Epi: Occasional /HPF / Bacteria: Few /HPF      CXR: Personally reviewed, showing no focal opacities or consolidations.

## 2017-06-11 NOTE — H&P ADULT - PROBLEM SELECTOR PLAN 1
- Oseltamavir 75mg twice daily  - droplet isolation precautions   - Mucinex as needed for cough   - Tylenol prn fever

## 2017-06-11 NOTE — ED PROVIDER NOTE - ATTENDING CONTRIBUTION TO CARE
Attending MD Welch:  I personally have seen and examined this patient.  Resident note reviewed and agree on plan of care and except where noted.  See HPI, PE, and MDM for details.

## 2017-06-11 NOTE — H&P ADULT - PROBLEM SELECTOR PLAN 4
- continue with Losartan and Norvasc. Will add Diltiazem if patient remains hypertensive  - taper off of clonidine

## 2017-06-11 NOTE — H&P ADULT - HISTORY OF PRESENT ILLNESS
On arrival to the ED, VS: Tmax 101.5, HR96, /72, RR20, SpO2 94% on room air. Patient received Azithromycin 500mg IVP, Oseltamavir 30mg, Tylenol 975mg, Albuterol nebulizers and Duonebs, and Solu-Medrol 80mg IVP. Ms. Martin is a 81F with COPD/Asthma and HTN, who presents with coughing and elevated blood pressure.  Patient was recently admitted briefly (5/23-5/24) with fever/chills/cough/SOB, thought secondary to a COPD exacerbation, however she insisted on going home, and she was discharged with plans for her to followup with her PMD (Dr. Tahir Haley) and Pulmonologist (Dr. Yordy Berry). The patient reports that she was in her usual state of health until three days ago, she started to have a cough productive of yellow sputum and wheezing, with associated fevers/chills. She denies shortness of breath. She denies sick contacts or recent travel. Otherwise ROS is negative: no HA/lightheadedness/dizziness. No abdominal pain/nausea/vomiting. No dysuria/hematuria. No myalgias. She reports that her blood pressures earlier today were in the 180's (at baseline they are usually 150's). However, she admits to not taking her medications --including the clonidine--consistently. Clonidine makes her mouth very dry, and so she only takes it when her pressures are high. The patient also has prescriptions for Olmesartan, Valsartan, Diltiazem, Norvasc, but says that she only takes the Valsartan and Clonidine, because she has "too many medications."     On arrival to the ED, VS: Tmax 101.5, HR96, /72, RR20, SpO2 94% on room air. Patient received Azithromycin 500mg IVP, Oseltamavir 30mg, Tylenol 975mg, Albuterol nebulizers and Duonebs, and Solu-Medrol 80mg IVP.

## 2017-06-11 NOTE — ED PROVIDER NOTE - OBJECTIVE STATEMENT
Patient is 81 y F with PMH htn, asthma/COPD presenting with productive cough and chills x 3 days, has felt like her asthma is worse, does not feel short of breath. Not on home o2, has been hospitalized for asthma COPD, denies intubation, is on prednisone 20. Hospitalized at end of may for COPD exacerbation.     PMD: Td  Pulm: none  ROS: Denies palpitations, recent sickness, HA, vision changes, SOB, chest pain, abdominal pain, n/v/d/c, dysuria, hematuria, rash, new joint aches, sick contacts, and recent travel. Patient is 81 y F with PMH htn, asthma/COPD presenting with productive cough and chills x 3 days, has felt like her asthma is worse, does not feel short of breath. Not on home o2, has been hospitalized for asthma COPD, denies intubation, is on prednisone 20. Hospitalized at end of may for COPD exacerbation.     PMD: unsure  Pulm: none  ROS: Denies palpitations, recent sickness, HA, vision changes, SOB, chest pain, abdominal pain, n/v/d/c, dysuria, hematuria, rash, new joint aches, sick contacts, and recent travel.

## 2017-06-11 NOTE — ED ADULT NURSE NOTE - OBJECTIVE STATEMENT
82 yo F presents to ED A+Ox3 c/o fever, SBO and productive cough for several days. Pt. states she has been having worsening SOB, was hospitalized at the end of May for COPD exacerbation. Pt. denies wearing oxygen at home. Pt. arrives with oxygen sat in low 90s, lung sounds noted to be wheezing in all fields. Pt. placed on 3L NC, given nebs as ordered, oxygen saturation improved to high 90s. Febrile at this time, tylenol given as ordered. Skin warm pink and dry. Abdomen soft. No edema noted to lower extremities. Denies chest pain, abdominal pain, recent travel, sick contacts, N/V. Comfort and safety measures in place.

## 2017-06-12 VITALS
DIASTOLIC BLOOD PRESSURE: 79 MMHG | SYSTOLIC BLOOD PRESSURE: 144 MMHG | TEMPERATURE: 98 F | HEART RATE: 89 BPM | RESPIRATION RATE: 18 BRPM | OXYGEN SATURATION: 97 %

## 2017-06-12 LAB
ANION GAP SERPL CALC-SCNC: 15 MMOL/L — SIGNIFICANT CHANGE UP (ref 5–17)
BASOPHILS # BLD AUTO: 0 K/UL — SIGNIFICANT CHANGE UP (ref 0–0.2)
BASOPHILS NFR BLD AUTO: 0.2 % — SIGNIFICANT CHANGE UP (ref 0–2)
BUN SERPL-MCNC: 15 MG/DL — SIGNIFICANT CHANGE UP (ref 7–23)
CALCIUM SERPL-MCNC: 9.3 MG/DL — SIGNIFICANT CHANGE UP (ref 8.4–10.5)
CHLORIDE SERPL-SCNC: 96 MMOL/L — SIGNIFICANT CHANGE UP (ref 96–108)
CO2 SERPL-SCNC: 24 MMOL/L — SIGNIFICANT CHANGE UP (ref 22–31)
CREAT SERPL-MCNC: 0.76 MG/DL — SIGNIFICANT CHANGE UP (ref 0.5–1.3)
EOSINOPHIL # BLD AUTO: 0 K/UL — SIGNIFICANT CHANGE UP (ref 0–0.5)
EOSINOPHIL NFR BLD AUTO: 0.2 % — SIGNIFICANT CHANGE UP (ref 0–6)
GLUCOSE SERPL-MCNC: 118 MG/DL — HIGH (ref 70–99)
HCT VFR BLD CALC: 43.7 % — SIGNIFICANT CHANGE UP (ref 34.5–45)
HGB BLD-MCNC: 14 G/DL — SIGNIFICANT CHANGE UP (ref 11.5–15.5)
LYMPHOCYTES # BLD AUTO: 0.8 K/UL — LOW (ref 1–3.3)
LYMPHOCYTES # BLD AUTO: 33.5 % — SIGNIFICANT CHANGE UP (ref 13–44)
MAGNESIUM SERPL-MCNC: 2 MG/DL — SIGNIFICANT CHANGE UP (ref 1.6–2.6)
MCHC RBC-ENTMCNC: 31 PG — SIGNIFICANT CHANGE UP (ref 27–34)
MCHC RBC-ENTMCNC: 32.1 GM/DL — SIGNIFICANT CHANGE UP (ref 32–36)
MCV RBC AUTO: 96.4 FL — SIGNIFICANT CHANGE UP (ref 80–100)
MONOCYTES # BLD AUTO: 0.1 K/UL — SIGNIFICANT CHANGE UP (ref 0–0.9)
MONOCYTES NFR BLD AUTO: 4.8 % — SIGNIFICANT CHANGE UP (ref 2–14)
NEUTROPHILS # BLD AUTO: 1.5 K/UL — LOW (ref 1.8–7.4)
NEUTROPHILS NFR BLD AUTO: 61.5 % — SIGNIFICANT CHANGE UP (ref 43–77)
PHOSPHATE SERPL-MCNC: 3 MG/DL — SIGNIFICANT CHANGE UP (ref 2.5–4.5)
PLAT MORPH BLD: NORMAL — SIGNIFICANT CHANGE UP
PLATELET # BLD AUTO: 157 K/UL — SIGNIFICANT CHANGE UP (ref 150–400)
POTASSIUM SERPL-MCNC: 4.8 MMOL/L — SIGNIFICANT CHANGE UP (ref 3.5–5.3)
POTASSIUM SERPL-SCNC: 4.8 MMOL/L — SIGNIFICANT CHANGE UP (ref 3.5–5.3)
RBC # BLD: 4.53 M/UL — SIGNIFICANT CHANGE UP (ref 3.8–5.2)
RBC # FLD: 11.8 % — SIGNIFICANT CHANGE UP (ref 10.3–14.5)
RBC BLD AUTO: SIGNIFICANT CHANGE UP
SODIUM SERPL-SCNC: 135 MMOL/L — SIGNIFICANT CHANGE UP (ref 135–145)
WBC # BLD: 2.2 K/UL — LOW (ref 3.8–10.5)
WBC # FLD AUTO: 2.2 K/UL — LOW (ref 3.8–10.5)

## 2017-06-12 PROCEDURE — 84100 ASSAY OF PHOSPHORUS: CPT

## 2017-06-12 PROCEDURE — 83735 ASSAY OF MAGNESIUM: CPT

## 2017-06-12 PROCEDURE — 87486 CHLMYD PNEUM DNA AMP PROBE: CPT

## 2017-06-12 PROCEDURE — 87581 M.PNEUMON DNA AMP PROBE: CPT

## 2017-06-12 PROCEDURE — 93005 ELECTROCARDIOGRAM TRACING: CPT

## 2017-06-12 PROCEDURE — 87633 RESP VIRUS 12-25 TARGETS: CPT

## 2017-06-12 PROCEDURE — 99239 HOSP IP/OBS DSCHRG MGMT >30: CPT

## 2017-06-12 PROCEDURE — 80053 COMPREHEN METABOLIC PANEL: CPT

## 2017-06-12 PROCEDURE — 96374 THER/PROPH/DIAG INJ IV PUSH: CPT

## 2017-06-12 PROCEDURE — 96375 TX/PRO/DX INJ NEW DRUG ADDON: CPT

## 2017-06-12 PROCEDURE — 87086 URINE CULTURE/COLONY COUNT: CPT

## 2017-06-12 PROCEDURE — 71045 X-RAY EXAM CHEST 1 VIEW: CPT

## 2017-06-12 PROCEDURE — 83880 ASSAY OF NATRIURETIC PEPTIDE: CPT

## 2017-06-12 PROCEDURE — 99285 EMERGENCY DEPT VISIT HI MDM: CPT | Mod: 25

## 2017-06-12 PROCEDURE — 81001 URINALYSIS AUTO W/SCOPE: CPT

## 2017-06-12 PROCEDURE — 87798 DETECT AGENT NOS DNA AMP: CPT

## 2017-06-12 PROCEDURE — 94640 AIRWAY INHALATION TREATMENT: CPT

## 2017-06-12 PROCEDURE — 85027 COMPLETE CBC AUTOMATED: CPT

## 2017-06-12 PROCEDURE — 87040 BLOOD CULTURE FOR BACTERIA: CPT

## 2017-06-12 PROCEDURE — 80048 BASIC METABOLIC PNL TOTAL CA: CPT

## 2017-06-12 RX ORDER — VALSARTAN 80 MG/1
1 TABLET ORAL
Qty: 0 | Refills: 0 | COMMUNITY

## 2017-06-12 RX ORDER — VALSARTAN 80 MG/1
1 TABLET ORAL
Qty: 30 | Refills: 0 | OUTPATIENT
Start: 2017-06-12

## 2017-06-12 RX ORDER — OLMESARTAN MEDOXOMIL 5 MG/1
1 TABLET, FILM COATED ORAL
Qty: 0 | Refills: 0 | COMMUNITY

## 2017-06-12 RX ORDER — DILTIAZEM HCL 120 MG
1 CAPSULE, EXT RELEASE 24 HR ORAL
Qty: 0 | Refills: 0 | COMMUNITY

## 2017-06-12 RX ADMIN — Medication 3 MILLILITER(S): at 11:19

## 2017-06-12 RX ADMIN — Medication 40 MILLIGRAM(S): at 05:20

## 2017-06-12 RX ADMIN — Medication 75 MILLIGRAM(S): at 05:20

## 2017-06-12 RX ADMIN — Medication 1 SPRAY(S): at 12:28

## 2017-06-12 RX ADMIN — AMLODIPINE BESYLATE 5 MILLIGRAM(S): 2.5 TABLET ORAL at 05:20

## 2017-06-12 RX ADMIN — BUDESONIDE AND FORMOTEROL FUMARATE DIHYDRATE 1 PUFF(S): 160; 4.5 AEROSOL RESPIRATORY (INHALATION) at 05:48

## 2017-06-12 RX ADMIN — Medication 3 MILLILITER(S): at 05:45

## 2017-06-12 RX ADMIN — FAMOTIDINE 20 MILLIGRAM(S): 10 INJECTION INTRAVENOUS at 05:20

## 2017-06-12 RX ADMIN — HEPARIN SODIUM 5000 UNIT(S): 5000 INJECTION INTRAVENOUS; SUBCUTANEOUS at 05:20

## 2017-06-12 RX ADMIN — VALSARTAN 40 MILLIGRAM(S): 80 TABLET ORAL at 05:20

## 2017-06-12 NOTE — PROGRESS NOTE ADULT - PROBLEM SELECTOR PLAN 4
- continue with Losartan and Norvasc. Will add Diltiazem if patient remains hypertensive  - tapered off of clonidine

## 2017-06-12 NOTE — DISCHARGE NOTE ADULT - HOSPITAL COURSE
ADMITTING HISTORY/PHYSICAL   Ms. Martin is a 81F with COPD/Asthma and HTN, who presents with coughing and elevated blood pressure.  Patient was recently admitted briefly (5/23-5/24) with fever/chills/cough/SOB, thought secondary to a COPD exacerbation, however she insisted on going home, and she was discharged with plans for her to followup with her PMD (Dr. Tahir Haley) and Pulmonologist (Dr. Yordy Berry). The patient reports that she was in her usual state of health until three days ago, she started to have a cough productive of yellow sputum and wheezing, with associated fevers/chills. She denies shortness of breath. She denies sick contacts or recent travel. Otherwise ROS is negative: no HA/lightheadedness/dizziness. No abdominal pain/nausea/vomiting. No dysuria/hematuria. No myalgias. She reports that her blood pressures earlier today were in the 180's (at baseline they are usually 150's). However, she admits to not taking her medications --including the clonidine--consistently. Clonidine makes her mouth very dry, and so she only takes it when her pressures are high. The patient also has prescriptions for Olmesartan, Valsartan, Diltiazem, Norvasc, but says that she only takes the Valsartan and Clonidine, because she has "too many medications." On arrival to the ED, VS: Tmax 101.5, HR96, /72, RR20, SpO2 94% on room air. Patient received Azithromycin 500mg IVP, Oseltamavir 30mg, Tylenol 975mg, Albuterol nebulizers and Duonebs, and Solu-Medrol 80mg IVP.     HOSPITAL COURSE:   She was admitted to General Medicine Service for COPD exacerbation and Influenza B URI. Oseltamavir 75mg BID was continued, in addition to COPD exacerbation regimen (Prednisone, Duonebs, Levaquin). Patient noted to have asymptomatic bacteriuria, and so Levaquin to continue for both coverage of COPD exacerbation and asymptomatic bacteriuria. Of note, on admission, patient had multiple anti-hypertensive medications in her possession (including Cardizem, Clonidine, Valsartan, Olmesartan, Norvasc), but admitted to only taking Losartan daily and Clonidine infrequently. Patient's outpatient provider contacted, and medication reconciliation done. ADMITTING HISTORY/PHYSICAL   Ms. Martin is a 81F with COPD/Asthma and HTN, who presents with coughing and elevated blood pressure.  Patient was recently admitted briefly (5/23-5/24) with fever/chills/cough/SOB, thought secondary to a COPD exacerbation, however she insisted on going home, and she was discharged with plans for her to followup with her PMD (Dr. Tahir Haley) and Pulmonologist (Dr. Yordy Berry). The patient reports that she was in her usual state of health until three days ago, she started to have a cough productive of yellow sputum and wheezing, with associated fevers/chills. She denies shortness of breath. She denies sick contacts or recent travel. Otherwise ROS is negative: no HA/lightheadedness/dizziness. No abdominal pain/nausea/vomiting. No dysuria/hematuria. No myalgias. She reports that her blood pressures earlier today were in the 180's (at baseline they are usually 150's). However, she admits to not taking her medications --including the clonidine--consistently. Clonidine makes her mouth very dry, and so she only takes it when her pressures are high. The patient also has prescriptions for Olmesartan, Valsartan, Diltiazem, Norvasc, but says that she only takes the Valsartan and Clonidine, because she has "too many medications." On arrival to the ED, VS: Tmax 101.5, HR96, /72, RR20, SpO2 94% on room air. Patient received Azithromycin 500mg IVP, Oseltamavir 30mg, Tylenol 975mg, Albuterol nebulizers and Duonebs, and Solu-Medrol 80mg IVP.     HOSPITAL COURSE:   She was admitted to General Medicine Service for COPD exacerbation and Influenza B URI. Oseltamavir 75mg BID was continued, in addition to COPD exacerbation regimen (Prednisone, Duonebs, Levaquin). Patient noted to have asymptomatic bacteriuria, and so Levaquin to continue for both coverage of COPD exacerbation and asymptomatic bacteriuria. Of note, on admission, patient had multiple anti-hypertensive medications in her possession (including Cardizem, Clonidine, Valsartan, Olmesartan, Norvasc), but admitted to only taking Losartan daily and Clonidine infrequently. Patient's outpatient provider contacted, and medication reconciliation done. Patient discharged in stable condition. She will establish care with General Internal Medicine clinic at [facility]. ADMITTING HISTORY/PHYSICAL   Ms. Martin is a 81F with COPD/Asthma and HTN, who presents with coughing and elevated blood pressure.  Patient was recently admitted briefly (5/23-5/24) with fever/chills/cough/SOB, thought secondary to a COPD exacerbation, however she insisted on going home, and she was discharged with plans for her to followup with her PMD (Dr. Tahir Haley) and Pulmonologist (Dr. Yordy Berry). The patient reports that she was in her usual state of health until three days ago, she started to have a cough productive of yellow sputum and wheezing, with associated fevers/chills. She denies shortness of breath. She denies sick contacts or recent travel. Otherwise ROS is negative: no HA/lightheadedness/dizziness. No abdominal pain/nausea/vomiting. No dysuria/hematuria. No myalgias. She reports that her blood pressures earlier today were in the 180's (at baseline they are usually 150's). However, she admits to not taking her medications --including the clonidine--consistently. Clonidine makes her mouth very dry, and so she only takes it when her pressures are high. The patient also has prescriptions for Olmesartan, Valsartan, Diltiazem, Norvasc, but says that she only takes the Valsartan and Clonidine, because she has "too many medications." On arrival to the ED, VS: Tmax 101.5, HR96, /72, RR20, SpO2 94% on room air. Patient received Azithromycin 500mg IVP, Oseltamavir 30mg, Tylenol 975mg, Albuterol nebulizers and Duonebs, and Solu-Medrol 80mg IVP.     HOSPITAL COURSE:   She was admitted to General Medicine Service for COPD exacerbation and Influenza B URI. Oseltamavir 75mg BID was continued, in addition to COPD exacerbation regimen (Prednisone, Duonebs, Levaquin). Patient noted to have asymptomatic bacteriuria, and so Levaquin to continue for both coverage of COPD exacerbation and asymptomatic bacteriuria. Of note, on admission, patient had multiple anti-hypertensive medications in her possession (including Cardizem, Clonidine, Valsartan, Olmesartan, Norvasc), but admitted to only taking Losartan daily and Clonidine infrequently. Patient's outpatient provider contacted, and medication reconciliation done. Patient discharged in stable condition. She will establish care with General Internal Medicine clinic at [facility].  Stable for dc home

## 2017-06-12 NOTE — DISCHARGE NOTE ADULT - CARE PLAN
Principal Discharge DX:	Influenza B  Goal:	Treat to cure of influenza  Instructions for follow-up, activity and diet:	Continue with Tamiflu for a total of 5 days. Continue with Mucinex as needed for chest congestion and cough.     IF you develop severe fevers, chills, night sweats, and/or shortness of breath, seek immediate medical attention.       Followup with your primary care doctor within 1 week of discharge for ongoing care.  Secondary Diagnosis:	COPD exacerbation  Instructions for follow-up, activity and diet:	Continue to take Prednisone and Levaquin for a total of 5 days. Use all inhalers as needed.  Secondary Diagnosis:	Asymptomatic bacteriuria  Instructions for follow-up, activity and diet:	You have signs of an asymptomatic bacterial infection of the urine. Continue to take your antibiotic as prescribed.  Secondary Diagnosis:	Essential hypertension

## 2017-06-12 NOTE — DISCHARGE NOTE ADULT - PLAN OF CARE
Treat to cure of influenza Continue with Tamiflu for a total of 5 days. Continue with Mucinex as needed for chest congestion and cough.     IF you develop severe fevers, chills, night sweats, and/or shortness of breath, seek immediate medical attention.       Followup with your primary care doctor within 1 week of discharge for ongoing care. Continue to take Prednisone and Levaquin for a total of 5 days. Use all inhalers as needed. You have signs of an asymptomatic bacterial infection of the urine. Continue to take your antibiotic as prescribed.

## 2017-06-12 NOTE — DISCHARGE NOTE ADULT - MEDICATION SUMMARY - MEDICATIONS TO STOP TAKING
I will STOP taking the medications listed below when I get home from the hospital:    cloNIDine 0.2 mg oral tablet  -- 1 tab(s) by mouth once a day    Advair Diskus 250 mcg-50 mcg inhalation powder  -- 1 puff(s) inhaled 2 times a day MDD:DISPENSE ONE INHALER    olmesartan 20 mg oral tablet  -- 1 tab(s) by mouth once a day    DilTIAZem Hydrochloride SR 90 mg/12 hours oral capsule, extended release  -- 1 cap(s) by mouth every 12 hours

## 2017-06-12 NOTE — DISCHARGE NOTE ADULT - PATIENT PORTAL LINK FT
“You can access the [facility] Patient Portal, offered by [facility], by registering with the following website: [email or web address]”

## 2017-06-12 NOTE — PROGRESS NOTE ADULT - PROBLEM SELECTOR PLAN 2
- continue with Prednisone 40mg x 5 days  - Azithromycin 500mg daily   - Duonebs as needed for dyspnea/bronchospasm - continue with Prednisone 40mg x 5 days  - c/w Levaquin (switch to po today)  - Duonebs as needed for dyspnea/bronchospasm

## 2017-06-12 NOTE — DISCHARGE NOTE ADULT - MEDICATION SUMMARY - MEDICATIONS TO TAKE
I will START or STAY ON the medications listed below when I get home from the hospital:    predniSONE 20 mg oral tablet  -- 2 tab(s) by mouth once a day  -- Indication: For COPD exacerbation    valsartan 40 mg oral tablet  -- 1 tab(s) by mouth once a day  -- Indication: For HTN (hypertension)    oseltamivir 75 mg oral capsule  -- 1 cap(s) by mouth 2 times a day  -- Indication: For Influenza B    albuterol 90 mcg/inh inhalation aerosol  -- 2 puff(s) inhaled every 6 hours, AS NEEDED, Shortness of Breath and/or Wheezing MDD:DISPENSE ONE INHALER  -- Indication: For COPD exacerbation    Tudorza Pressair 400 mcg/inh inhalation powder  -- 1 inhaler(s) inhaled once a day MDD:DISPENSE ONE INHALER  -- Indication: For COPD exacerbation    Symbicort 160 mcg-4.5 mcg/inh inhalation aerosol  -- 2 puff(s) inhaled 2 times a day MDD:DISPENSE ONE INHALER  -- Indication: For COPD exacerbation    amLODIPine 5 mg oral tablet  -- 1 tab(s) by mouth once a day  -- Indication: For HTN (hypertension)    guaiFENesin 400 mg oral tablet  -- 1 tab(s) by mouth every 4 hours, As Needed -for congestion -for cough  -- Medication should be taken with plenty of water.    -- Indication: For Influenza B    famotidine 20 mg oral tablet  -- 1 tab(s) by mouth 2 times a day  -- Indication: For GERD    fluticasone 50 mcg/inh nasal spray  -- 1 spray(s) into nose once a day  -- Indication: For COPD exacerbation    Levaquin 750 mg oral tablet  -- 1 tab(s) by mouth once a day  -- Avoid prolonged or excessive exposure to direct and/or artificial sunlight while taking this medication.  Do not take dairy products, antacids, or iron preparations within one hour of this medication.  Finish all this medication unless otherwise directed by prescriber.  May cause drowsiness or dizziness.  Medication should be taken with plenty of water.    -- Indication: For COPD exacerbation

## 2017-06-12 NOTE — PROGRESS NOTE ADULT - PROBLEM SELECTOR PLAN 3
- given poor compliance to anti-hypertensives, most concerning of which is the clonidine, patient's regimen re-adjusted to Losartan and Norvasc

## 2017-06-12 NOTE — PROGRESS NOTE ADULT - SUBJECTIVE AND OBJECTIVE BOX
Patient is a 81y old  Female who presents with a chief complaint of SOB, COPD (2017 20:16)      SUBJECTIVE / OVERNIGHT EVENTS: Patient reports intermittent cough, improving. No chest pain. No fevers/chills/night sweats.     MEDICATIONS  (STANDING):  valsartan 40milliGRAM(s) Oral daily  fluticasone propionate 50 MICROgram(s)/spray Nasal Spray 1Spray(s) Both Nostrils daily  amLODIPine   Tablet 5milliGRAM(s) Oral daily  buDESOnide 160 MICROgram(s)/formoterol 4.5 MICROgram(s) Inhaler 1Puff(s) Inhalation two times a day  famotidine    Tablet 20milliGRAM(s) Oral two times a day  levoFLOXacin IVPB 750milliGRAM(s) IV Intermittent every 24 hours  levoFLOXacin IVPB  IV Intermittent   ALBUTerol/ipratropium for Nebulization 3milliLiter(s) Nebulizer every 6 hours  oseltamivir 75milliGRAM(s) Oral two times a day  predniSONE   Tablet 40milliGRAM(s) Oral daily  heparin  Injectable 5000Unit(s) SubCutaneous every 8 hours    MEDICATIONS  (PRN):  acetaminophen   Tablet 650milliGRAM(s) Oral every 6 hours PRN For Temp greater than 38 C (100.4 F)  guaiFENesin/dextromethorphan  Syrup 10milliLiter(s) Oral every 4 hours PRN Cough      Vital Signs Last 24 Hrs  Reviewed, remains afebrile, normal HR/BP/RR/SpO2  T(C): 36.6, Max: 38.6 (06-11 @ 12:29)  HR: 78 (68 - 96)  BP: 132/57 (102/65 - 158/72)  RR: 18 (18 - 22)  SpO2: 95% (9% - 96%)  Wt(kg): --  CAPILLARY BLOOD GLUCOSE    I&O's Summary    I & Os for current day (as of 2017 10:08)  =============================================  IN: 150 ml / OUT: 0 ml / NET: 150 ml      PHYSICAL EXAM:  GENERAL: NAD, well-developed  HEAD:  Atraumatic, Normocephalic  EYES: EOMI, PERRLA, conjunctiva and sclera clear  NECK: Supple, No JVD  CHEST/LUNG: mild wheezes bilaterally   HEART: Regular rate and rhythm; No murmurs, rubs, or gallops  ABDOMEN: Soft, Nontender, Nondistended; Bowel sounds present  EXTREMITIES:  2+ Peripheral Pulses, No clubbing, cyanosis, or edema  PSYCH: AAOx3  NEUROLOGY: non-focal  SKIN: No rashes or lesions    LABS:  Reviewed showing leukopenia (WBC 2.2); normal BMP; RVP positive for Entero/Rhinovirus                         14.0   2.2   )-----------( 157      ( 2017 06:58 )             43.7     06-12    135  |  96  |  15  ----------------------------<  118<H>  4.8   |  24  |  0.76    Ca    9.3      2017 06:58  Phos  3.0     06-12  Mg     2.0     06-12    TPro  7.1  /  Alb  3.9  /  TBili  0.3  /  DBili  x   /  AST  32  /  ALT  35  /  AlkPhos  48  06-11          Urinalysis Basic - ( 2017 14:35 )    Color: PL Yellow / Appearance: Clear / S.012 / pH: x  Gluc: x / Ketone: Negative  / Bili: Negative / Urobili: Negative   Blood: x / Protein: Trace / Nitrite: Positive   Leuk Esterase: Small / RBC: 2-5 /HPF / WBC 5-10 /HPF   Sq Epi: x / Non Sq Epi: Occasional /HPF / Bacteria: Few /HPF        RADIOLOGY & ADDITIONAL TESTS:    Imaging Personally Reviewed: CXR reviewed, showing no focal opacities/consolidations.     Consultant(s) Notes Reviewed:  None    Care Discussed with Consultants/Other Providers: None

## 2017-06-12 NOTE — DISCHARGE NOTE ADULT - CARE PROVIDER_API CALL
Tahir Haley), Surgery  [address] [place] [address]  Phone: [phone]  Fax: [phone]    Yordy Berry), Critical Care Medicine; Internal Medicine; Pulmonary Disease  [address] [facility] [address] [place] [id number]  Phone: [phone]  Fax: [phone] Tahir Haley), Surgery  [address] [place] [address]  Phone: [phone]  Fax: [phone]    Yordy Berry), Critical Care Medicine; Internal Medicine; Pulmonary Disease  [address] [id number]  Phone: [phone]  Fax: [phone]    General Internal Medicine,   [address] [place] [address]  Phone: [phone]  Fax: (   )    -

## 2017-06-12 NOTE — DISCHARGE NOTE ADULT - PROVIDER TOKENS
TOKCECE:'[id number]',TOKCECE:'[id number]' TOKEN:'[id number]:'[id number]',FREE:[LAST:[General Internal Medicine],PHONE:[phone]],FAX:[(   )    -],ADDRESS:[[address] [place] [address]]]

## 2017-06-13 LAB
CULTURE RESULTS: SIGNIFICANT CHANGE UP
SPECIMEN SOURCE: SIGNIFICANT CHANGE UP

## 2017-06-20 ENCOUNTER — INPATIENT (INPATIENT)
Facility: HOSPITAL | Age: 82
LOS: 5 days | Discharge: HOME | DRG: 871 | End: 2017-06-26
Attending: HOSPITALIST | Admitting: HOSPITALIST
Payer: MEDICARE

## 2017-06-20 VITALS
OXYGEN SATURATION: 92 % | RESPIRATION RATE: 18 BRPM | DIASTOLIC BLOOD PRESSURE: 80 MMHG | SYSTOLIC BLOOD PRESSURE: 164 MMHG | HEART RATE: 106 BPM | TEMPERATURE: 99 F

## 2017-06-20 DIAGNOSIS — I10 ESSENTIAL (PRIMARY) HYPERTENSION: ICD-10-CM

## 2017-06-20 DIAGNOSIS — I47.1 SUPRAVENTRICULAR TACHYCARDIA: ICD-10-CM

## 2017-06-20 DIAGNOSIS — Z29.9 ENCOUNTER FOR PROPHYLACTIC MEASURES, UNSPECIFIED: ICD-10-CM

## 2017-06-20 DIAGNOSIS — A41.9 SEPSIS, UNSPECIFIED ORGANISM: ICD-10-CM

## 2017-06-20 DIAGNOSIS — J18.9 PNEUMONIA, UNSPECIFIED ORGANISM: ICD-10-CM

## 2017-06-20 DIAGNOSIS — J44.1 CHRONIC OBSTRUCTIVE PULMONARY DISEASE WITH (ACUTE) EXACERBATION: ICD-10-CM

## 2017-06-20 LAB
ALBUMIN SERPL ELPH-MCNC: 4 G/DL — SIGNIFICANT CHANGE UP (ref 3.3–5)
ALP SERPL-CCNC: 49 U/L — SIGNIFICANT CHANGE UP (ref 40–120)
ALT FLD-CCNC: 42 U/L RC — SIGNIFICANT CHANGE UP (ref 10–45)
ANION GAP SERPL CALC-SCNC: 16 MMOL/L — SIGNIFICANT CHANGE UP (ref 5–17)
APPEARANCE UR: ABNORMAL
APTT BLD: 27.6 SEC — SIGNIFICANT CHANGE UP (ref 27.5–37.4)
AST SERPL-CCNC: 33 U/L — SIGNIFICANT CHANGE UP (ref 10–40)
BACTERIA # UR AUTO: ABNORMAL /HPF
BASE EXCESS BLDV CALC-SCNC: 4.4 MMOL/L — HIGH (ref -2–2)
BASOPHILS # BLD AUTO: 0.1 K/UL — SIGNIFICANT CHANGE UP (ref 0–0.2)
BASOPHILS NFR BLD AUTO: 0.6 % — SIGNIFICANT CHANGE UP (ref 0–2)
BILIRUB SERPL-MCNC: 0.7 MG/DL — SIGNIFICANT CHANGE UP (ref 0.2–1.2)
BILIRUB UR-MCNC: NEGATIVE — SIGNIFICANT CHANGE UP
BUN SERPL-MCNC: 11 MG/DL — SIGNIFICANT CHANGE UP (ref 7–23)
CA-I SERPL-SCNC: 1.17 MMOL/L — SIGNIFICANT CHANGE UP (ref 1.12–1.3)
CALCIUM SERPL-MCNC: 9.5 MG/DL — SIGNIFICANT CHANGE UP (ref 8.4–10.5)
CHLORIDE BLDV-SCNC: 103 MMOL/L — SIGNIFICANT CHANGE UP (ref 96–108)
CHLORIDE SERPL-SCNC: 96 MMOL/L — SIGNIFICANT CHANGE UP (ref 96–108)
CK MB BLD-MCNC: 2.7 % — SIGNIFICANT CHANGE UP (ref 0–3.5)
CK MB CFR SERPL CALC: 2.9 NG/ML — SIGNIFICANT CHANGE UP (ref 0–3.8)
CO2 BLDV-SCNC: 32 MMOL/L — HIGH (ref 22–30)
CO2 SERPL-SCNC: 26 MMOL/L — SIGNIFICANT CHANGE UP (ref 22–31)
COLOR SPEC: SIGNIFICANT CHANGE UP
CREAT SERPL-MCNC: 0.63 MG/DL — SIGNIFICANT CHANGE UP (ref 0.5–1.3)
DIFF PNL FLD: NEGATIVE — SIGNIFICANT CHANGE UP
EOSINOPHIL # BLD AUTO: 0 K/UL — SIGNIFICANT CHANGE UP (ref 0–0.5)
EOSINOPHIL NFR BLD AUTO: 0.1 % — SIGNIFICANT CHANGE UP (ref 0–6)
EPI CELLS # UR: SIGNIFICANT CHANGE UP /HPF
GAS PNL BLDV: 133 MMOL/L — LOW (ref 136–145)
GAS PNL BLDV: SIGNIFICANT CHANGE UP
GLUCOSE BLDV-MCNC: 89 MG/DL — SIGNIFICANT CHANGE UP (ref 70–99)
GLUCOSE SERPL-MCNC: 89 MG/DL — SIGNIFICANT CHANGE UP (ref 70–99)
GLUCOSE UR QL: NEGATIVE — SIGNIFICANT CHANGE UP
HCO3 BLDV-SCNC: 31 MMOL/L — HIGH (ref 21–29)
HCT VFR BLD CALC: 46.1 % — HIGH (ref 34.5–45)
HCT VFR BLDA CALC: 47 % — SIGNIFICANT CHANGE UP (ref 39–50)
HGB BLD CALC-MCNC: 15.3 G/DL — SIGNIFICANT CHANGE UP (ref 11.5–15.5)
HGB BLD-MCNC: 15.2 G/DL — SIGNIFICANT CHANGE UP (ref 11.5–15.5)
INR BLD: 1.12 RATIO — SIGNIFICANT CHANGE UP (ref 0.88–1.16)
KETONES UR-MCNC: ABNORMAL
LACTATE BLDV-MCNC: 0.9 MMOL/L — SIGNIFICANT CHANGE UP (ref 0.7–2)
LEUKOCYTE ESTERASE UR-ACNC: NEGATIVE — SIGNIFICANT CHANGE UP
LYMPHOCYTES # BLD AUTO: 16.2 % — SIGNIFICANT CHANGE UP (ref 13–44)
LYMPHOCYTES # BLD AUTO: 2 K/UL — SIGNIFICANT CHANGE UP (ref 1–3.3)
MAGNESIUM SERPL-MCNC: 2.2 MG/DL — SIGNIFICANT CHANGE UP (ref 1.6–2.6)
MCHC RBC-ENTMCNC: 32.3 PG — SIGNIFICANT CHANGE UP (ref 27–34)
MCHC RBC-ENTMCNC: 32.9 GM/DL — SIGNIFICANT CHANGE UP (ref 32–36)
MCV RBC AUTO: 98 FL — SIGNIFICANT CHANGE UP (ref 80–100)
MONOCYTES # BLD AUTO: 1.2 K/UL — HIGH (ref 0–0.9)
MONOCYTES NFR BLD AUTO: 9.5 % — SIGNIFICANT CHANGE UP (ref 2–14)
NEUTROPHILS # BLD AUTO: 8.9 K/UL — HIGH (ref 1.8–7.4)
NEUTROPHILS NFR BLD AUTO: 73.6 % — SIGNIFICANT CHANGE UP (ref 43–77)
NITRITE UR-MCNC: NEGATIVE — SIGNIFICANT CHANGE UP
NT-PROBNP SERPL-SCNC: 110 PG/ML — SIGNIFICANT CHANGE UP (ref 0–300)
PCO2 BLDV: 54 MMHG — HIGH (ref 35–50)
PH BLDV: 7.37 — SIGNIFICANT CHANGE UP (ref 7.35–7.45)
PH UR: 7 — SIGNIFICANT CHANGE UP (ref 5–8)
PLATELET # BLD AUTO: 246 K/UL — SIGNIFICANT CHANGE UP (ref 150–400)
PO2 BLDV: 27 MMHG — SIGNIFICANT CHANGE UP (ref 25–45)
POTASSIUM BLDV-SCNC: 3.6 MMOL/L — SIGNIFICANT CHANGE UP (ref 3.5–5)
POTASSIUM SERPL-MCNC: 4.2 MMOL/L — SIGNIFICANT CHANGE UP (ref 3.5–5.3)
POTASSIUM SERPL-SCNC: 4.2 MMOL/L — SIGNIFICANT CHANGE UP (ref 3.5–5.3)
PROCALCITONIN SERPL-MCNC: <0.05 NG/ML — SIGNIFICANT CHANGE UP (ref 0–0.04)
PROT SERPL-MCNC: 7.6 G/DL — SIGNIFICANT CHANGE UP (ref 6–8.3)
PROT UR-MCNC: SIGNIFICANT CHANGE UP
PROTHROM AB SERPL-ACNC: 12.2 SEC — SIGNIFICANT CHANGE UP (ref 9.8–12.7)
RAPID RVP RESULT: SIGNIFICANT CHANGE UP
RBC # BLD: 4.7 M/UL — SIGNIFICANT CHANGE UP (ref 3.8–5.2)
RBC # FLD: 12.3 % — SIGNIFICANT CHANGE UP (ref 10.3–14.5)
SAO2 % BLDV: 48 % — LOW (ref 67–88)
SODIUM SERPL-SCNC: 138 MMOL/L — SIGNIFICANT CHANGE UP (ref 135–145)
SP GR SPEC: 1.01 — LOW (ref 1.01–1.02)
TROPONIN T SERPL-MCNC: <0.01 NG/ML — SIGNIFICANT CHANGE UP (ref 0–0.06)
TSH SERPL-MCNC: 1.82 UIU/ML — SIGNIFICANT CHANGE UP (ref 0.27–4.2)
UROBILINOGEN FLD QL: NEGATIVE — SIGNIFICANT CHANGE UP
WBC # BLD: 12.1 K/UL — HIGH (ref 3.8–10.5)
WBC # FLD AUTO: 12.1 K/UL — HIGH (ref 3.8–10.5)
WBC UR QL: SIGNIFICANT CHANGE UP /HPF (ref 0–5)

## 2017-06-20 PROCEDURE — 99223 1ST HOSP IP/OBS HIGH 75: CPT | Mod: GC

## 2017-06-20 PROCEDURE — 99291 CRITICAL CARE FIRST HOUR: CPT | Mod: 25

## 2017-06-20 PROCEDURE — 71275 CT ANGIOGRAPHY CHEST: CPT | Mod: 26

## 2017-06-20 PROCEDURE — 71020: CPT | Mod: 26

## 2017-06-20 PROCEDURE — 93010 ELECTROCARDIOGRAM REPORT: CPT

## 2017-06-20 RX ORDER — SODIUM CHLORIDE 9 MG/ML
1000 INJECTION INTRAMUSCULAR; INTRAVENOUS; SUBCUTANEOUS
Qty: 0 | Refills: 0 | Status: DISCONTINUED | OUTPATIENT
Start: 2017-06-20 | End: 2017-06-21

## 2017-06-20 RX ORDER — CEFEPIME 1 G/1
2000 INJECTION, POWDER, FOR SOLUTION INTRAMUSCULAR; INTRAVENOUS ONCE
Qty: 0 | Refills: 0 | Status: COMPLETED | OUTPATIENT
Start: 2017-06-20 | End: 2017-06-20

## 2017-06-20 RX ORDER — AZITHROMYCIN 500 MG/1
500 TABLET, FILM COATED ORAL ONCE
Qty: 0 | Refills: 0 | Status: COMPLETED | OUTPATIENT
Start: 2017-06-20 | End: 2017-06-20

## 2017-06-20 RX ORDER — ENOXAPARIN SODIUM 100 MG/ML
40 INJECTION SUBCUTANEOUS EVERY 24 HOURS
Qty: 0 | Refills: 0 | Status: DISCONTINUED | OUTPATIENT
Start: 2017-06-20 | End: 2017-06-26

## 2017-06-20 RX ORDER — ACETAMINOPHEN 500 MG
975 TABLET ORAL ONCE
Qty: 0 | Refills: 0 | Status: COMPLETED | OUTPATIENT
Start: 2017-06-20 | End: 2017-06-20

## 2017-06-20 RX ORDER — SODIUM CHLORIDE 9 MG/ML
1000 INJECTION INTRAMUSCULAR; INTRAVENOUS; SUBCUTANEOUS ONCE
Qty: 0 | Refills: 0 | Status: COMPLETED | OUTPATIENT
Start: 2017-06-20 | End: 2017-06-20

## 2017-06-20 RX ORDER — VANCOMYCIN HCL 1 G
1000 VIAL (EA) INTRAVENOUS EVERY 12 HOURS
Qty: 0 | Refills: 0 | Status: DISCONTINUED | OUTPATIENT
Start: 2017-06-20 | End: 2017-06-22

## 2017-06-20 RX ORDER — METOPROLOL TARTRATE 50 MG
25 TABLET ORAL ONCE
Qty: 0 | Refills: 0 | Status: COMPLETED | OUTPATIENT
Start: 2017-06-20 | End: 2017-06-20

## 2017-06-20 RX ORDER — AZITHROMYCIN 500 MG/1
500 TABLET, FILM COATED ORAL EVERY 24 HOURS
Qty: 0 | Refills: 0 | Status: DISCONTINUED | OUTPATIENT
Start: 2017-06-21 | End: 2017-06-21

## 2017-06-20 RX ORDER — PIPERACILLIN AND TAZOBACTAM 4; .5 G/20ML; G/20ML
3.38 INJECTION, POWDER, LYOPHILIZED, FOR SOLUTION INTRAVENOUS EVERY 8 HOURS
Qty: 0 | Refills: 0 | Status: DISCONTINUED | OUTPATIENT
Start: 2017-06-20 | End: 2017-06-23

## 2017-06-20 RX ORDER — FAMOTIDINE 10 MG/ML
20 INJECTION INTRAVENOUS
Qty: 0 | Refills: 0 | Status: DISCONTINUED | OUTPATIENT
Start: 2017-06-20 | End: 2017-06-26

## 2017-06-20 RX ORDER — IPRATROPIUM/ALBUTEROL SULFATE 18-103MCG
3 AEROSOL WITH ADAPTER (GRAM) INHALATION ONCE
Qty: 0 | Refills: 0 | Status: COMPLETED | OUTPATIENT
Start: 2017-06-20 | End: 2017-06-20

## 2017-06-20 RX ORDER — PIPERACILLIN AND TAZOBACTAM 4; .5 G/20ML; G/20ML
3.38 INJECTION, POWDER, LYOPHILIZED, FOR SOLUTION INTRAVENOUS ONCE
Qty: 0 | Refills: 0 | Status: COMPLETED | OUTPATIENT
Start: 2017-06-20 | End: 2017-06-20

## 2017-06-20 RX ORDER — ACETAMINOPHEN 500 MG
650 TABLET ORAL EVERY 6 HOURS
Qty: 0 | Refills: 0 | Status: DISCONTINUED | OUTPATIENT
Start: 2017-06-20 | End: 2017-06-26

## 2017-06-20 RX ORDER — VANCOMYCIN HCL 1 G
1000 VIAL (EA) INTRAVENOUS ONCE
Qty: 0 | Refills: 0 | Status: COMPLETED | OUTPATIENT
Start: 2017-06-20 | End: 2017-06-20

## 2017-06-20 RX ORDER — IPRATROPIUM/ALBUTEROL SULFATE 18-103MCG
3 AEROSOL WITH ADAPTER (GRAM) INHALATION EVERY 6 HOURS
Qty: 0 | Refills: 0 | Status: DISCONTINUED | OUTPATIENT
Start: 2017-06-20 | End: 2017-06-26

## 2017-06-20 RX ORDER — IPRATROPIUM/ALBUTEROL SULFATE 18-103MCG
3 AEROSOL WITH ADAPTER (GRAM) INHALATION
Qty: 0 | Refills: 0 | Status: COMPLETED | OUTPATIENT
Start: 2017-06-20 | End: 2017-06-20

## 2017-06-20 RX ADMIN — Medication 3 MILLILITER(S): at 12:22

## 2017-06-20 RX ADMIN — Medication 3 MILLILITER(S): at 12:54

## 2017-06-20 RX ADMIN — ENOXAPARIN SODIUM 40 MILLIGRAM(S): 100 INJECTION SUBCUTANEOUS at 22:28

## 2017-06-20 RX ADMIN — SODIUM CHLORIDE 1000 MILLILITER(S): 9 INJECTION INTRAMUSCULAR; INTRAVENOUS; SUBCUTANEOUS at 12:22

## 2017-06-20 RX ADMIN — PIPERACILLIN AND TAZOBACTAM 200 GRAM(S): 4; .5 INJECTION, POWDER, LYOPHILIZED, FOR SOLUTION INTRAVENOUS at 22:27

## 2017-06-20 RX ADMIN — AZITHROMYCIN 250 MILLIGRAM(S): 500 TABLET, FILM COATED ORAL at 13:59

## 2017-06-20 RX ADMIN — Medication 250 MILLIGRAM(S): at 18:35

## 2017-06-20 RX ADMIN — SODIUM CHLORIDE 75 MILLILITER(S): 9 INJECTION INTRAMUSCULAR; INTRAVENOUS; SUBCUTANEOUS at 22:28

## 2017-06-20 RX ADMIN — CEFEPIME 100 MILLIGRAM(S): 1 INJECTION, POWDER, FOR SOLUTION INTRAMUSCULAR; INTRAVENOUS at 15:55

## 2017-06-20 RX ADMIN — Medication 3 MILLILITER(S): at 18:35

## 2017-06-20 RX ADMIN — Medication 975 MILLIGRAM(S): at 13:59

## 2017-06-20 RX ADMIN — Medication 125 MILLIGRAM(S): at 12:22

## 2017-06-20 RX ADMIN — SODIUM CHLORIDE 1000 MILLILITER(S): 9 INJECTION INTRAMUSCULAR; INTRAVENOUS; SUBCUTANEOUS at 13:59

## 2017-06-20 RX ADMIN — Medication 25 MILLIGRAM(S): at 16:25

## 2017-06-20 NOTE — ED PROVIDER NOTE - PROGRESS NOTE DETAILS
Patient spiked a fever. Still mildly tachycardic but feels markedly improved after nebulizer treatment. Awaiting x-ray. Pt had brief run of probable SVT. Attempted to obtain 12 lead but broke as leads being placed. Pt asymptomatic. Pt currently in sinus tachycardia. Indeterminite X-ray, radiology recommending CT. Given Tachycardia runs and recent hospitalization will get CTA. several runs of SVT noted, will give oral metoprolol CT with pneumonia, will treat for HCAP, admit

## 2017-06-20 NOTE — H&P ADULT - NSHPOUTPATIENTPROVIDERS_GEN_ALL_CORE
Dr. Berry (pulmonology)  Was to initiate care with [address] [place] [address] for primary care after discharge 6/12

## 2017-06-20 NOTE — H&P ADULT - PROBLEM SELECTOR PLAN 2
Patient meeting sepsis criteria on admission with leukocytosis, tachycardia, and source of HCAP  - plan for HCAP management as above

## 2017-06-20 NOTE — ED PROVIDER NOTE - RESPIRATORY, MLM
Tachypneic. Prolonged expiratory phase. Mild use of accessory muscles. Wet sounding couch. Crackles anteriorly.

## 2017-06-20 NOTE — ED ADULT NURSE NOTE - OBJECTIVE STATEMENT
82 yo presents to the ED A&Ox4 from home c/o SOB. pt is wheezing and crackles heard  bilaterally. pt is tachypneic. pt reports fever, pt febrile upon assessment. pt placed on O2 NC. EKG done at bedside. pt placed on cardiac monitor. NSR. NAD noted. safety and comfort measures maintained.

## 2017-06-20 NOTE — H&P ADULT - ASSESSMENT
80 y/o female with h/o COPD/asthma (on 3L O2 NC at home), HTN, recent admission (discharged 6/12/17) for Influenza and COPD exacerbation, presenting for evaluation of fever and cough, admitted for management of HCAP complicated by COPD exacerbation.

## 2017-06-20 NOTE — ED PROVIDER NOTE - OBJECTIVE STATEMENT
82 y/o female patient with pmhx of asthma and HTN presents to the ED c/o shortness of breath, cough, subjective fever, chills, nausea, and abdominal pain x1 month. This is the patients' 3rd visit for these complaints. Feels improved after previous visits, but symptoms began to worsen again 3 days after she was last discharged on 6/12/17. Patient uses 3L oxygen at home. Uses an inhaler at home, but with very limited relief. Patient was flu positive a week ago. Reports having to wake up during the night to catch her breath. No history of smoking.   Surgical history of partial hysterectomy.  Denies chest pain, vomiting, new urinary symptoms, or any other symptoms.

## 2017-06-20 NOTE — ED PROVIDER NOTE - MEDICAL DECISION MAKING DETAILS
Elderly female with prior admission for COPD exacerbation and flu. Patient now with recurrent symptoms after symptoms resolved with medication. Patient reports subjective fever at home. Appears clinically dry. Diffusely wheezing and increased work of breathing. Will rule out COPD exacerbation and acute infectious process. Given symptoms and physical exam, ACS and PE are less likely as differential diagnoses, but will evaluate as clinically indicated.

## 2017-06-20 NOTE — H&P ADULT - PROBLEM SELECTOR PLAN 4
Patient with episode of SVT in ED with HR~150s, spontaneously broke prior to obtaining EKG. Likely provoked in setting of   - c/w Patient with episode of SVT in ED with HR~150s, spontaneously broke prior to obtaining EKG. Likely provoked in setting of sepsis. Improved with 2L NS and metoprolol  - continue to monitor on telemetry  - c/w IVF hydration Patient with episode of SVT in ED with HR~150s, broke prior to obtaining EKG. Likely provoked in setting of sepsis. Improved with 2L NS and metoprolol  - continue to monitor on telemetry  - c/w IVF hydration

## 2017-06-20 NOTE — ED ADULT NURSE REASSESSMENT NOTE - NS ED NURSE REASSESS COMMENT FT1
#18G RAC placed, patent, no signs of redness/swelling noted to site. CT called and stated pt is ready for transport to CT.
Pt ambulated to restroom, steady gait. Urine specimen collected and sent. NAD noted. Safety maintained. Pt states she feels better after DuoNeb treatment. 3L/NC in place O2Sat 97%. Myron RAINES aware.
Pt remains tachypneic. DuoNeb administered as ordered. Audible crackles noted. MD at bedside for eval. NAD noted. Plan of care discussed w/ pt. Safety maintained.
pt transported to [facility] A&Ox4 on portable O2 via NC. IV fluids being administered, antibiotics being administered. will continue to monitor pt when she returns to the unit.
A&Ox3. Ambulatory. Pt is in no current distress. Comfort and safety provided.

## 2017-06-20 NOTE — H&P ADULT - PROBLEM SELECTOR PLAN 5
Goal SBP<140/90  - patient septic on admission with soft blood pressures  - will hold home anti-htn medications for now, restart as clinically warranted

## 2017-06-20 NOTE — ED PROVIDER NOTE - OTHER FINDINGS
Q waves anteroinferiorly similar to prior EKG. was having a run of SVT and broke just before repeat EKG

## 2017-06-20 NOTE — H&P ADULT - NSHPLABSRESULTS_GEN_ALL_CORE
LABS:                        15.2   12.1  )-----------( 246      ( 2017 12:04 )             46.1     06-20    138  |  96  |  11  ----------------------------<  89  4.2   |  26  |  0.63    Ca    9.5      2017 12:04  Mg     2.2     -20    TPro  7.6  /  Alb  4.0  /  TBili  0.7  /  DBili  x   /  AST  33  /  ALT  42  /  AlkPhos  49  06-20    PT/INR - ( 2017 12:04 )   PT: 12.2 sec;   INR: 1.12 ratio         PTT - ( 2017 12:04 )  PTT:27.6 sec  CARDIAC MARKERS ( 2017 12:04 )  x     / <0.01 ng/mL / 108 U/L / x     / 2.9 ng/mL      Urinalysis Basic - ( 2017 13:40 )    Color: PL Yellow / Appearance: SL Turbid / S.009 / pH: x  Gluc: x / Ketone: Trace  / Bili: Negative / Urobili: Negative   Blood: x / Protein: Trace / Nitrite: Negative   Leuk Esterase: Negative / RBC: x / WBC 2-5 /HPF   Sq Epi: x / Non Sq Epi: Occasional /HPF / Bacteria: Few /HPF    Labs reviewed and interpreted by me.    RADIOLOGY & ADDITIONAL TESTS:  EXAM:  CT ANGIO CHEST (W)AW IC                        PROCEDURE DATE:  2017    INTERPRETATION:  EXAMINATION: CT ANGIO CHEST (W)AW IC  CLINICAL INDICATION: Shortness of breath and cough.  IMPRESSION: Study limited by motion artifacts.  Several nodules/tree-in-bud opacities in the lingula, right upper lobe   and right middle lobe may be of infectious etiology. Recommend follow-up   CT in 3 months to evaluate for resolution.    Limited evaluation for segmental and subsegmental pulmonary embolism. No   main, left, right, or lobar pulmonary embolism.    Imaging Personally Reviewed and interpreted by me. LABS:                        15.2   12.1  )-----------( 246      ( 2017 12:04 )             46.1     06-20    138  |  96  |  11  ----------------------------<  89  4.2   |  26  |  0.63    Ca    9.5      2017 12:04  Mg     2.2     06-20    TPro  7.6  /  Alb  4.0  /  TBili  0.7  /  DBili  x   /  AST  33  /  ALT  42  /  AlkPhos  49  06-20    PT/INR - ( 2017 12:04 )   PT: 12.2 sec;   INR: 1.12 ratio         PTT - ( 2017 12:04 )  PTT:27.6 sec  CARDIAC MARKERS ( 2017 12:04 )  x     / <0.01 ng/mL / 108 U/L / x     / 2.9 ng/mL      Urinalysis Basic - ( 2017 13:40 )    Color: PL Yellow / Appearance: SL Turbid / S.009 / pH: x  Gluc: x / Ketone: Trace  / Bili: Negative / Urobili: Negative   Blood: x / Protein: Trace / Nitrite: Negative   Leuk Esterase: Negative / RBC: x / WBC 2-5 /HPF   Sq Epi: x / Non Sq Epi: Occasional /HPF / Bacteria: Few /HPF    Labs reviewed and interpreted by me.    RADIOLOGY & ADDITIONAL TESTS:  EXAM:  CT ANGIO CHEST (W)AW IC                        PROCEDURE DATE:  2017    INTERPRETATION:  EXAMINATION: CT ANGIO CHEST (W)AW IC  CLINICAL INDICATION: Shortness of breath and cough.  IMPRESSION: Study limited by motion artifacts.  Several nodules/tree-in-bud opacities in the lingula, right upper lobe   and right middle lobe may be of infectious etiology. Recommend follow-up   CT in 3 months to evaluate for resolution.    Limited evaluation for segmental and subsegmental pulmonary embolism. No   main, left, right, or lobar pulmonary embolism.    Imaging Personally Reviewed and interpreted by me.    EKG showing sinus tachycardia with

## 2017-06-20 NOTE — ED PROVIDER NOTE - CRITICAL CARE PROVIDED
consultation with other physicians/conducted a detailed discussion of DNR status/additional history taking/direct patient care (not related to procedure)

## 2017-06-20 NOTE — H&P ADULT - HISTORY OF PRESENT ILLNESS
82 y/o female with h/o COPD/asthma (on 3L O2 NC at home), HTN, recent admission (discharged 6/12/17) for Influenza and COPD exacerbation, presenting to ED for evaluation of fevers, cough and dyspnea for the last 3 days. Patient states she was feeling near baseline after discharge from hospital on 6/12, but over the last 3 - 4 days she began to again develop chills, fever, dyspnea and cough. She has been using her inhalers at home with minimal relief of symptoms, and has been waking up at night coughing and short of breath. She also has been having chills and subjective fevers.     On arrival to ED, vitals: 100.5F, 112, 105/71, 18, 92% on 3L NC. Labs were drawn and revealed wbc=12.1. CTA was obtained and revealed RUL and RML tree-in-bud lesions consistent with infection. Patient was given cefepime, vancomycin, azithromycin, 2L NS, tylenol, solumedrol, duoneb x3 and admitted for management of HCAP c/b COPD exacerbation. Patient subsequently developed SVT in ED, which broke spontaneously prior to obtaining and EKG. She received a dose of metoprolol, and HR is now in NSR. 82 y/o female with h/o COPD/asthma (on 3L O2 NC at home), HTN, recent admission (discharged 6/12/17) for Influenza and COPD exacerbation, presenting to ED for evaluation of fevers, cough and dyspnea for the last 3 days. Patient states she was feeling near baseline after discharge from hospital on 6/12, but over the last 3 - 4 days she began to again develop chills, fever, dyspnea and cough. She has been using her inhalers at home with minimal relief of symptoms, and has been waking up at night coughing and short of breath. Endorses scant yellow sputum production as well. She also has been having chills and subjective fevers, although she has not checked her temperature at home.     On arrival to ED, vitals: 100.5F, 112, 105/71, 18, 92% on 3L NC. Labs were drawn and revealed wbc=12.1. CTA was obtained and revealed RUL and RML tree-in-bud lesions consistent with infection. Patient was given cefepime, vancomycin, azithromycin, 2L NS, tylenol, solumedrol, duoneb x3 and admitted for management of HCAP c/b COPD exacerbation. Patient subsequently developed SVT in ED, which broke spontaneously prior to obtaining and EKG. She received a dose of metoprolol, and HR is now in NSR. 80 y/o female with h/o COPD/asthma (on 3L O2 NC at home), HTN, recent admission (discharged 6/12/17) for Influenza and COPD exacerbation, presenting to ED for evaluation of fevers, cough and dyspnea for the last 3 days. Patient states she was feeling near baseline after discharge from hospital on 6/12, but over the last 3 - 4 days she began to again develop chills, fever, dyspnea and cough. She has been using her inhalers at home with minimal relief of symptoms, and has been waking up at night coughing and short of breath. Endorses scant yellow sputum production as well. She also has been having chills and subjective fevers, although she has not checked her temperature at home.     On arrival to ED, vitals: 100.5F, 112, 105/71, 18, 92% on 3L NC. Labs were drawn and revealed wbc=12.1. CTA was obtained and revealed RUL and RML tree-in-bud lesions consistent with infection. Patient was given cefepime, vancomycin, azithromycin, 2L NS, tylenol, solumedrol, duoneb x3 and admitted for management of HCAP c/b COPD exacerbation. Patient subsequently developed SVT in ED, which broke after metoprolol prior to obtaining and EKG. She received a dose of metoprolol, and HR is now in NSR.

## 2017-06-20 NOTE — H&P ADULT - PROBLEM SELECTOR PLAN 3
Patient with HCAP and concomitant COPD exacerbation  - c/w O2 supplementation  - c/w prednisone 40mg daily  - hold home inhalers, start standing duonebs for now  - contact patient's pulmonologist Dr. Berry in the morning to inform of admisison

## 2017-06-20 NOTE — H&P ADULT - NSHPREVIEWOFSYSTEMS_GEN_ALL_CORE
Review of Systems:   CONSTITUTIONAL: No fever, weight loss, or fatigue  EYES: No eye pain, visual disturbances, or discharge  ENMT:  No difficulty hearing, tinnitus, vertigo; No sinus or throat pain  NECK: No pain or stiffness  BREASTS: No pain, masses, or nipple discharge  RESPIRATORY: No cough, wheezing, chills or hemoptysis; No shortness of breath  CARDIOVASCULAR: No chest pain, palpitations, dizziness, or leg swelling  GASTROINTESTINAL: No abdominal or epigastric pain. No nausea, vomiting, or hematemesis; No diarrhea or constipation. No melena or hematochezia.  GENITOURINARY: No dysuria, frequency, hematuria, or incontinence  NEUROLOGICAL: No headaches, memory loss, loss of strength, numbness, or tremors  SKIN: No itching, burning, rashes, or lesions   LYMPH NODES: No enlarged glands  ENDOCRINE: No heat or cold intolerance; No hair loss  MUSCULOSKELETAL: No joint pain or swelling; No muscle, back, or extremity pain  PSYCHIATRIC: No depression, anxiety, mood swings, or difficulty sleeping  HEME/LYMPH: No easy bruising, or bleeding gums  ALLERY AND IMMUNOLOGIC: No hives or eczema Review of Systems:   CONSTITUTIONAL: +FEVER, CHILLS, no weight loss, or fatigue  EYES: No eye pain, visual disturbances, or discharge  ENMT:  No difficulty hearing, tinnitus, vertigo; No sinus or throat pain  NECK: No pain or stiffness  BREASTS: No pain, masses, or nipple discharge  RESPIRATORY: +COUGH, SPUTUM PRODUCTION, WHEEZE AND DYSPNEA  CARDIOVASCULAR: No chest pain, palpitations, dizziness, or leg swelling  GASTROINTESTINAL: No abdominal or epigastric pain. No nausea, vomiting, or hematemesis; No diarrhea or constipation. No melena or hematochezia.  GENITOURINARY: No dysuria, frequency, hematuria, or incontinence  NEUROLOGICAL: No headaches, memory loss, loss of strength, numbness, or tremors  SKIN: No itching, burning, rashes, or lesions   LYMPH NODES: No enlarged glands  ENDOCRINE: No heat or cold intolerance; No hair loss  MUSCULOSKELETAL: No joint pain or swelling; No muscle, back, or extremity pain  PSYCHIATRIC: No depression, anxiety, mood swings, or difficulty sleeping  HEME/LYMPH: No easy bruising, or bleeding gums  ALLERY AND IMMUNOLOGIC: No hives or eczema

## 2017-06-20 NOTE — H&P ADULT - NSHPPHYSICALEXAM_GEN_ALL_CORE
Vital Signs Last 24 Hrs  T(C): 36.7, Max: 38.1 (06-20 @ 13:11)  HR: 74 (74 - 112)  BP: 117/71 (105/71 - 164/80)  RR: 18 (18 - 22)  SpO2: 96% (92% - 100%)  Wt(kg): --      PHYSICAL EXAM:  GENERAL: NAD, well-developed  HEAD:  Atraumatic, Normocephalic  EYES: EOMI, PERRLA, conjunctiva and sclera clear  NECK: Supple, No JVD  CHEST/LUNG: Clear to auscultation bilaterally; No wheeze  HEART: Regular rate and rhythm; No murmurs, rubs, or gallops  ABDOMEN: Soft, Nontender, Nondistended; Bowel sounds present  EXTREMITIES:  2+ Peripheral Pulses, No clubbing, cyanosis, or edema  PSYCH: AAOx3  NEUROLOGY: non-focal  SKIN: No rashes or lesions Vital Signs Last 24 Hrs  T(C): 36.7, Max: 38.1 (06-20 @ 13:11)  HR: 74 (74 - 112)  BP: 117/71 (105/71 - 164/80)  RR: 18 (18 - 22)  SpO2: 96% (92% - 100%)  Wt(kg): --      PHYSICAL EXAM:  GENERAL: NAD, well-developed  HEAD:  Atraumatic, Normocephalic  EYES: EOMI, PERRLA, conjunctiva and sclera clear  NECK: Supple, No JVD  CHEST/LUNG: No respiratory distress, on O2 NC, wheezes in posterior lung fields bilaterally R>L  HEART: Regular rate and rhythm; No murmurs, rubs, or gallops  ABDOMEN: Soft, Nontender, Nondistended; Bowel sounds present  EXTREMITIES:  2+ Peripheral Pulses, No clubbing, cyanosis, or edema  PSYCH: AAOx3  NEUROLOGY: non-focal  SKIN: No rashes or lesions

## 2017-06-20 NOTE — ED PROVIDER NOTE - CARE PLAN
Principal Discharge DX:	HCAP (healthcare-associated pneumonia)  Goal:	post-influenza pneumonia  Secondary Diagnosis:	COPD exacerbation  Secondary Diagnosis:	SVT (supraventricular tachycardia)

## 2017-06-20 NOTE — H&P ADULT - PROBLEM SELECTOR PLAN 1
Patient recently admitted with influenza and COPD exacerbation, now presenting with fevers and cough, found to have pneumonia on CT chest. Given recent hospitalization, will cover for HCAP, likely due to post-viral PNA.  - f/u BCx  - c/w broad empiric antibiotics with vancomycin and zosyn  - monitor fever curve, trend wbc  - tylenol prn fever  - c/w IVF hydration Patient recently admitted with influenza and COPD exacerbation, now presenting with fevers and cough, found to have pneumonia on CT chest. Given recent hospitalization, will cover for HCAP, likely due to post-viral PNA.  - f/u BCx  - c/w broad empiric antibiotics with vancomycin and zosyn. Azithromycin for atypical coverage  - check urine legionella, if negative will d/c azithromycin  - monitor fever curve, trend wbc  - tylenol prn fever  - c/w IVF hydration

## 2017-06-20 NOTE — ED PROVIDER NOTE - CRITICAL CARE INDICATION, MLM
patient was critically ill... Patient was critically ill with a high probability of imminent or life threatening deterioration. Resp failure requiring back to back nebs, freuqent reeval, management of SVT

## 2017-06-21 DIAGNOSIS — Z71.89 OTHER SPECIFIED COUNSELING: ICD-10-CM

## 2017-06-21 DIAGNOSIS — R63.8 OTHER SYMPTOMS AND SIGNS CONCERNING FOOD AND FLUID INTAKE: ICD-10-CM

## 2017-06-21 LAB
ANION GAP SERPL CALC-SCNC: 11 MMOL/L — SIGNIFICANT CHANGE UP (ref 5–17)
BASOPHILS # BLD AUTO: 0 K/UL — SIGNIFICANT CHANGE UP (ref 0–0.2)
BASOPHILS NFR BLD AUTO: 0.2 % — SIGNIFICANT CHANGE UP (ref 0–2)
BUN SERPL-MCNC: 10 MG/DL — SIGNIFICANT CHANGE UP (ref 7–23)
CALCIUM SERPL-MCNC: 8.3 MG/DL — LOW (ref 8.4–10.5)
CHLORIDE SERPL-SCNC: 107 MMOL/L — SIGNIFICANT CHANGE UP (ref 96–108)
CO2 SERPL-SCNC: 24 MMOL/L — SIGNIFICANT CHANGE UP (ref 22–31)
CREAT SERPL-MCNC: 0.57 MG/DL — SIGNIFICANT CHANGE UP (ref 0.5–1.3)
CULTURE RESULTS: NO GROWTH — SIGNIFICANT CHANGE UP
EOSINOPHIL # BLD AUTO: 0 K/UL — SIGNIFICANT CHANGE UP (ref 0–0.5)
EOSINOPHIL NFR BLD AUTO: 0.1 % — SIGNIFICANT CHANGE UP (ref 0–6)
GLUCOSE SERPL-MCNC: 136 MG/DL — HIGH (ref 70–99)
HCT VFR BLD CALC: 39.5 % — SIGNIFICANT CHANGE UP (ref 34.5–45)
HGB BLD-MCNC: 12.8 G/DL — SIGNIFICANT CHANGE UP (ref 11.5–15.5)
LEGIONELLA AG UR QL: NEGATIVE — SIGNIFICANT CHANGE UP
LYMPHOCYTES # BLD AUTO: 1 K/UL — SIGNIFICANT CHANGE UP (ref 1–3.3)
LYMPHOCYTES # BLD AUTO: 14.4 % — SIGNIFICANT CHANGE UP (ref 13–44)
MAGNESIUM SERPL-MCNC: 2.3 MG/DL — SIGNIFICANT CHANGE UP (ref 1.6–2.6)
MCHC RBC-ENTMCNC: 31.7 PG — SIGNIFICANT CHANGE UP (ref 27–34)
MCHC RBC-ENTMCNC: 32.3 GM/DL — SIGNIFICANT CHANGE UP (ref 32–36)
MCV RBC AUTO: 98 FL — SIGNIFICANT CHANGE UP (ref 80–100)
MONOCYTES # BLD AUTO: 0.4 K/UL — SIGNIFICANT CHANGE UP (ref 0–0.9)
MONOCYTES NFR BLD AUTO: 5.4 % — SIGNIFICANT CHANGE UP (ref 2–14)
NEUTROPHILS # BLD AUTO: 5.3 K/UL — SIGNIFICANT CHANGE UP (ref 1.8–7.4)
NEUTROPHILS NFR BLD AUTO: 79.9 % — HIGH (ref 43–77)
PHOSPHATE SERPL-MCNC: 1.6 MG/DL — LOW (ref 2.5–4.5)
PLATELET # BLD AUTO: 230 K/UL — SIGNIFICANT CHANGE UP (ref 150–400)
POTASSIUM SERPL-MCNC: 4.2 MMOL/L — SIGNIFICANT CHANGE UP (ref 3.5–5.3)
POTASSIUM SERPL-SCNC: 4.2 MMOL/L — SIGNIFICANT CHANGE UP (ref 3.5–5.3)
RBC # BLD: 4.03 M/UL — SIGNIFICANT CHANGE UP (ref 3.8–5.2)
RBC # FLD: 12.2 % — SIGNIFICANT CHANGE UP (ref 10.3–14.5)
SODIUM SERPL-SCNC: 142 MMOL/L — SIGNIFICANT CHANGE UP (ref 135–145)
SPECIMEN SOURCE: SIGNIFICANT CHANGE UP
WBC # BLD: 6.7 K/UL — SIGNIFICANT CHANGE UP (ref 3.8–10.5)
WBC # FLD AUTO: 6.7 K/UL — SIGNIFICANT CHANGE UP (ref 3.8–10.5)

## 2017-06-21 PROCEDURE — 99233 SBSQ HOSP IP/OBS HIGH 50: CPT | Mod: GC

## 2017-06-21 RX ORDER — POTASSIUM PHOSPHATE, MONOBASIC POTASSIUM PHOSPHATE, DIBASIC 236; 224 MG/ML; MG/ML
15 INJECTION, SOLUTION INTRAVENOUS ONCE
Qty: 0 | Refills: 0 | Status: COMPLETED | OUTPATIENT
Start: 2017-06-21 | End: 2017-06-21

## 2017-06-21 RX ORDER — SODIUM,POTASSIUM PHOSPHATES 278-250MG
1 POWDER IN PACKET (EA) ORAL
Qty: 0 | Refills: 0 | Status: COMPLETED | OUTPATIENT
Start: 2017-06-21 | End: 2017-06-22

## 2017-06-21 RX ADMIN — Medication 1 DROP(S): at 22:48

## 2017-06-21 RX ADMIN — Medication 1 DROP(S): at 13:11

## 2017-06-21 RX ADMIN — AZITHROMYCIN 250 MILLIGRAM(S): 500 TABLET, FILM COATED ORAL at 00:33

## 2017-06-21 RX ADMIN — Medication 200 MILLIGRAM(S): at 17:24

## 2017-06-21 RX ADMIN — Medication 3 MILLILITER(S): at 17:23

## 2017-06-21 RX ADMIN — PIPERACILLIN AND TAZOBACTAM 25 GRAM(S): 4; .5 INJECTION, POWDER, LYOPHILIZED, FOR SOLUTION INTRAVENOUS at 22:49

## 2017-06-21 RX ADMIN — Medication 200 MILLIGRAM(S): at 23:56

## 2017-06-21 RX ADMIN — Medication 250 MILLIGRAM(S): at 05:33

## 2017-06-21 RX ADMIN — FAMOTIDINE 20 MILLIGRAM(S): 10 INJECTION INTRAVENOUS at 17:24

## 2017-06-21 RX ADMIN — PIPERACILLIN AND TAZOBACTAM 25 GRAM(S): 4; .5 INJECTION, POWDER, LYOPHILIZED, FOR SOLUTION INTRAVENOUS at 05:52

## 2017-06-21 RX ADMIN — ENOXAPARIN SODIUM 40 MILLIGRAM(S): 100 INJECTION SUBCUTANEOUS at 22:48

## 2017-06-21 RX ADMIN — Medication 3 MILLILITER(S): at 00:33

## 2017-06-21 RX ADMIN — Medication 1 TABLET(S): at 13:11

## 2017-06-21 RX ADMIN — Medication 40 MILLIGRAM(S): at 05:33

## 2017-06-21 RX ADMIN — Medication 1 TABLET(S): at 17:23

## 2017-06-21 RX ADMIN — POTASSIUM PHOSPHATE, MONOBASIC POTASSIUM PHOSPHATE, DIBASIC 62.5 MILLIMOLE(S): 236; 224 INJECTION, SOLUTION INTRAVENOUS at 09:15

## 2017-06-21 RX ADMIN — Medication 3 MILLILITER(S): at 13:15

## 2017-06-21 RX ADMIN — FAMOTIDINE 20 MILLIGRAM(S): 10 INJECTION INTRAVENOUS at 05:33

## 2017-06-21 RX ADMIN — Medication 3 MILLILITER(S): at 23:56

## 2017-06-21 RX ADMIN — Medication 250 MILLIGRAM(S): at 17:23

## 2017-06-21 RX ADMIN — Medication 1 TABLET(S): at 22:49

## 2017-06-21 RX ADMIN — Medication 3 MILLILITER(S): at 05:32

## 2017-06-21 RX ADMIN — PIPERACILLIN AND TAZOBACTAM 25 GRAM(S): 4; .5 INJECTION, POWDER, LYOPHILIZED, FOR SOLUTION INTRAVENOUS at 13:12

## 2017-06-21 RX ADMIN — Medication 200 MILLIGRAM(S): at 09:16

## 2017-06-21 RX ADMIN — Medication 200 MILLIGRAM(S): at 13:11

## 2017-06-21 NOTE — PROGRESS NOTE ADULT - SUBJECTIVE AND OBJECTIVE BOX
Patient is a 81y old  Female who presents with a chief complaint of SOB, COPD (2017 22:41)      SUBJECTIVE / OVERNIGHT EVENTS:    no acute events o/n. Patient continues to report productive cough with yellow sputum. Denies current shakes/chills. Denies dyspnea.     MEDICATIONS  (STANDING):  enoxaparin Injectable 40milliGRAM(s) SubCutaneous every 24 hours  famotidine    Tablet 20milliGRAM(s) Oral two times a day  vancomycin  IVPB 1000milliGRAM(s) IV Intermittent every 12 hours  piperacillin/tazobactam IVPB. 3.375Gram(s) IV Intermittent every 8 hours  azithromycin  IVPB 500milliGRAM(s) IV Intermittent every 24 hours  predniSONE   Tablet 40milliGRAM(s) Oral daily  ALBUTerol/ipratropium for Nebulization 3milliLiter(s) Nebulizer every 6 hours  sodium chloride 0.9%. 1000milliLiter(s) IV Continuous <Continuous>  guaiFENesin    Syrup 200milliGRAM(s) Oral every 6 hours  potassium phosphate IVPB 15milliMole(s) IV Intermittent once    MEDICATIONS  (PRN):  acetaminophen   Tablet 650milliGRAM(s) Oral every 6 hours PRN For Temp greater than 38 C (100.4 F)      Vital Signs Last 24 Hrs  T(C): 36.6, Max: 38.1 (06-20 @ 13:11)  HR: 80 (74 - 112)  BP: 119/68 (105/71 - 164/80)  RR: 18 (18 - 22)  SpO2: 97% (92% - 100%)  Wt(kg): --  CAPILLARY BLOOD GLUCOSE    I&O's Summary    I & Os for current day (as of 2017 08:50)  =============================================  IN: 1490 ml / OUT: 500 ml / NET: 990 ml      PHYSICAL EXAM:  GENERAL: NAD, well-developed  HEAD:  Atraumatic, Normocephalic  EYES: EOMI, PERRLA, conjunctiva and sclera clear  NECK: Supple, No JVD  CHEST/LUNG: expiratory wheezing and scattered rhonchi  HEART: Regular rate and rhythm; No murmurs, rubs, or gallops  ABDOMEN: Soft, Nontender, Nondistended; Bowel sounds present  EXTREMITIES:  2+ Peripheral Pulses, No clubbing, cyanosis, or edema  PSYCH: AAOx3  NEUROLOGY: non-focal  SKIN: No rashes or lesions    LABS:                        12.8   6.7   )-----------( 230      ( 2017 06:29 )             39.5     -    142  |  107  |  10  ----------------------------<  136<H>  4.2   |  24  |  0.57    Ca    8.3<L>      2017 06:26  Phos  1.6       Mg     2.3         TPro  7.6  /  Alb  4.0  /  TBili  0.7  /  DBili  x   /  AST  33  /  ALT  42  /  AlkPhos  49  06-20    PT/INR - ( 2017 12:04 )   PT: 12.2 sec;   INR: 1.12 ratio         PTT - ( 2017 12:04 )  PTT:27.6 sec  CARDIAC MARKERS ( 2017 12:04 )  x     / <0.01 ng/mL / 108 U/L / x     / 2.9 ng/mL      Urinalysis Basic - ( 2017 13:40 )    Color: PL Yellow / Appearance: SL Turbid / S.009 / pH: x  Gluc: x / Ketone: Trace  / Bili: Negative / Urobili: Negative   Blood: x / Protein: Trace / Nitrite: Negative   Leuk Esterase: Negative / RBC: x / WBC 2-5 /HPF   Sq Epi: x / Non Sq Epi: Occasional /HPF / Bacteria: Few /HPF        RADIOLOGY & ADDITIONAL TESTS:    Imaging Personally Reviewed:    Consultant(s) Notes Reviewed:      Care Discussed with Consultants/Other Providers:

## 2017-06-21 NOTE — PROGRESS NOTE ADULT - PROBLEM SELECTOR PLAN 1
Patient found to have sepsis 2/2 post viral HCAP now with resolved sepsis.   - c/w vancomycin, zosyn, and azithromycin  - f/u cultures

## 2017-06-21 NOTE — PROGRESS NOTE ADULT - PROBLEM SELECTOR PLAN 2
Patient a/w post viral HCAP now with sepsis resolved  - c/w vancomycin, zosyn, and azithromycin  - f/u cultures  - c/w robitussin

## 2017-06-21 NOTE — PROGRESS NOTE ADULT - PROBLEM SELECTOR PLAN 5
- Diet: DASH/TLC Patient with hx of Htn  - patient on amlodipine and valsartan at home.   - antihypertensives held in setting of sepsis  - BP currently stable. monitor for now

## 2017-06-21 NOTE — PROGRESS NOTE ADULT - PROBLEM SELECTOR PLAN 4
Patient with hx of Htn  - patient on amlodipine and valsartan at home.   - antihypertensives held in setting of sepsis  - BP currently stable. monitor for now Patient had episode of SVT in setting of sepsis now resolved. no further events on tele  - c/w telemetry for 24 hours

## 2017-06-22 DIAGNOSIS — I10 ESSENTIAL (PRIMARY) HYPERTENSION: ICD-10-CM

## 2017-06-22 DIAGNOSIS — I47.1 SUPRAVENTRICULAR TACHYCARDIA: ICD-10-CM

## 2017-06-22 LAB
ANION GAP SERPL CALC-SCNC: 8 MMOL/L — SIGNIFICANT CHANGE UP (ref 5–17)
BUN SERPL-MCNC: 15 MG/DL — SIGNIFICANT CHANGE UP (ref 7–23)
CALCIUM SERPL-MCNC: 8.3 MG/DL — LOW (ref 8.4–10.5)
CHLORIDE SERPL-SCNC: 107 MMOL/L — SIGNIFICANT CHANGE UP (ref 96–108)
CO2 SERPL-SCNC: 27 MMOL/L — SIGNIFICANT CHANGE UP (ref 22–31)
CREAT SERPL-MCNC: 0.65 MG/DL — SIGNIFICANT CHANGE UP (ref 0.5–1.3)
GLUCOSE SERPL-MCNC: 119 MG/DL — HIGH (ref 70–99)
HCT VFR BLD CALC: 35.5 % — SIGNIFICANT CHANGE UP (ref 34.5–45)
HGB BLD-MCNC: 12.4 G/DL — SIGNIFICANT CHANGE UP (ref 11.5–15.5)
MAGNESIUM SERPL-MCNC: 2 MG/DL — SIGNIFICANT CHANGE UP (ref 1.6–2.6)
MCHC RBC-ENTMCNC: 33.9 PG — SIGNIFICANT CHANGE UP (ref 27–34)
MCHC RBC-ENTMCNC: 35 GM/DL — SIGNIFICANT CHANGE UP (ref 32–36)
MCV RBC AUTO: 96.8 FL — SIGNIFICANT CHANGE UP (ref 80–100)
PHOSPHATE SERPL-MCNC: 2.7 MG/DL — SIGNIFICANT CHANGE UP (ref 2.5–4.5)
PLATELET # BLD AUTO: 222 K/UL — SIGNIFICANT CHANGE UP (ref 150–400)
POTASSIUM SERPL-MCNC: 4.2 MMOL/L — SIGNIFICANT CHANGE UP (ref 3.5–5.3)
POTASSIUM SERPL-SCNC: 4.2 MMOL/L — SIGNIFICANT CHANGE UP (ref 3.5–5.3)
RBC # BLD: 3.66 M/UL — LOW (ref 3.8–5.2)
RBC # FLD: 12 % — SIGNIFICANT CHANGE UP (ref 10.3–14.5)
SODIUM SERPL-SCNC: 142 MMOL/L — SIGNIFICANT CHANGE UP (ref 135–145)
VANCOMYCIN TROUGH SERPL-MCNC: 7.4 UG/ML — LOW (ref 10–20)
WBC # BLD: 11.9 K/UL — HIGH (ref 3.8–10.5)
WBC # FLD AUTO: 11.9 K/UL — HIGH (ref 3.8–10.5)

## 2017-06-22 PROCEDURE — 99233 SBSQ HOSP IP/OBS HIGH 50: CPT

## 2017-06-22 RX ORDER — VANCOMYCIN HCL 1 G
1250 VIAL (EA) INTRAVENOUS EVERY 12 HOURS
Qty: 0 | Refills: 0 | Status: COMPLETED | OUTPATIENT
Start: 2017-06-22 | End: 2017-06-24

## 2017-06-22 RX ADMIN — Medication 1 DROP(S): at 13:06

## 2017-06-22 RX ADMIN — ENOXAPARIN SODIUM 40 MILLIGRAM(S): 100 INJECTION SUBCUTANEOUS at 22:33

## 2017-06-22 RX ADMIN — Medication 3 MILLILITER(S): at 11:48

## 2017-06-22 RX ADMIN — FAMOTIDINE 20 MILLIGRAM(S): 10 INJECTION INTRAVENOUS at 17:22

## 2017-06-22 RX ADMIN — PIPERACILLIN AND TAZOBACTAM 25 GRAM(S): 4; .5 INJECTION, POWDER, LYOPHILIZED, FOR SOLUTION INTRAVENOUS at 22:32

## 2017-06-22 RX ADMIN — Medication 40 MILLIGRAM(S): at 06:16

## 2017-06-22 RX ADMIN — Medication 1 TABLET(S): at 08:45

## 2017-06-22 RX ADMIN — FAMOTIDINE 20 MILLIGRAM(S): 10 INJECTION INTRAVENOUS at 06:16

## 2017-06-22 RX ADMIN — Medication 200 MILLIGRAM(S): at 11:48

## 2017-06-22 RX ADMIN — PIPERACILLIN AND TAZOBACTAM 25 GRAM(S): 4; .5 INJECTION, POWDER, LYOPHILIZED, FOR SOLUTION INTRAVENOUS at 13:06

## 2017-06-22 RX ADMIN — Medication 200 MILLIGRAM(S): at 23:02

## 2017-06-22 RX ADMIN — Medication 3 MILLILITER(S): at 06:16

## 2017-06-22 RX ADMIN — Medication 166.67 MILLIGRAM(S): at 11:47

## 2017-06-22 RX ADMIN — Medication 1 DROP(S): at 06:16

## 2017-06-22 RX ADMIN — Medication 200 MILLIGRAM(S): at 06:15

## 2017-06-22 RX ADMIN — Medication 166.67 MILLIGRAM(S): at 22:33

## 2017-06-22 RX ADMIN — Medication 200 MILLIGRAM(S): at 17:22

## 2017-06-22 RX ADMIN — Medication 3 MILLILITER(S): at 17:22

## 2017-06-22 RX ADMIN — Medication 1 DROP(S): at 22:32

## 2017-06-22 RX ADMIN — Medication 3 MILLILITER(S): at 23:02

## 2017-06-22 NOTE — PROGRESS NOTE ADULT - PROBLEM SELECTOR PLAN 4
Patient with hx of Htn  - patient on amlodipine and valsartan at home.   - antihypertensives held in setting of sepsis  - BP currently stable. monitor for now

## 2017-06-22 NOTE — PROGRESS NOTE ADULT - PROBLEM SELECTOR PLAN 6
- full code - full code  - anticipate discharge home when wheezing improves and patient is transitioned to oral antibiotics

## 2017-06-22 NOTE — PROGRESS NOTE ADULT - SUBJECTIVE AND OBJECTIVE BOX
Patient is a 81y old  Female who presents with a chief complaint of SOB, COPD (2017 22:41)      SUBJECTIVE / OVERNIGHT EVENTS:    no acute events o/n. Patient reports that cough has improved and is no longer productive. She denies dyspnea and chest pain.     MEDICATIONS  (STANDING):  enoxaparin Injectable 40milliGRAM(s) SubCutaneous every 24 hours  famotidine    Tablet 20milliGRAM(s) Oral two times a day  piperacillin/tazobactam IVPB. 3.375Gram(s) IV Intermittent every 8 hours  predniSONE   Tablet 40milliGRAM(s) Oral daily  ALBUTerol/ipratropium for Nebulization 3milliLiter(s) Nebulizer every 6 hours  guaiFENesin    Syrup 200milliGRAM(s) Oral every 6 hours  artificial  tears Solution 1Drop(s) Both EYES three times a day  potassium acid phosphate/sodium acid phosphate tablet (K-PHOS No. 2) 1Tablet(s) Oral four times a day with meals  vancomycin  IVPB 1250milliGRAM(s) IV Intermittent every 12 hours    MEDICATIONS  (PRN):  acetaminophen   Tablet 650milliGRAM(s) Oral every 6 hours PRN For Temp greater than 38 C (100.4 F)      Vital Signs Last 24 Hrs  T(C): 36.3, Max: 36.7 (06-21 @ 12:12)  HR: 81 (81 - 91)  BP: 127/75 (126/70 - 135/79)  RR: 18 (18 - 18)  SpO2: 94% (93% - 97%)  Wt(kg): --  CAPILLARY BLOOD GLUCOSE    I&O's Summary    I & Os for current day (as of 2017 07:56)  =============================================  IN: 1490 ml / OUT: 400 ml / NET: 1090 ml      PHYSICAL EXAM:  GENERAL: NAD, well-developed  HEAD:  Atraumatic, Normocephalic  EYES: EOMI, PERRLA, conjunctiva and sclera clear  NECK: Supple, No JVD  CHEST/LUNG: Clear to auscultation bilaterally; No wheeze  HEART: Regular rate and rhythm; No murmurs, rubs, or gallops  ABDOMEN: Soft, Nontender, Nondistended; Bowel sounds present  EXTREMITIES:  2+ Peripheral Pulses, No clubbing, cyanosis, or edema  PSYCH: AAOx3  NEUROLOGY: non-focal  SKIN: No rashes or lesions    LABS:                        12.4   11.9  )-----------( 222      ( 2017 05:04 )             35.5     06-22    142  |  107  |  15  ----------------------------<  119<H>  4.2   |  27  |  0.65    Ca    8.3<L>      2017 05:04  Phos  2.7     06-22  Mg     2.0     06-22    TPro  7.6  /  Alb  4.0  /  TBili  0.7  /  DBili  x   /  AST  33  /  ALT  42  /  AlkPhos  49  06-20    PT/INR - ( 2017 12:04 )   PT: 12.2 sec;   INR: 1.12 ratio         PTT - ( 2017 12:04 )  PTT:27.6 sec  CARDIAC MARKERS ( 2017 12:04 )  x     / <0.01 ng/mL / 108 U/L / x     / 2.9 ng/mL      Urinalysis Basic - ( 2017 13:40 )    Color: PL Yellow / Appearance: SL Turbid / S.009 / pH: x  Gluc: x / Ketone: Trace  / Bili: Negative / Urobili: Negative   Blood: x / Protein: Trace / Nitrite: Negative   Leuk Esterase: Negative / RBC: x / WBC 2-5 /HPF   Sq Epi: x / Non Sq Epi: Occasional /HPF / Bacteria: Few /HPF        RADIOLOGY & ADDITIONAL TESTS:    Imaging Personally Reviewed:    Consultant(s) Notes Reviewed:      Care Discussed with Consultants/Other Providers: Patient is a 81y old  Female who presents with a chief complaint of SOB, COPD (2017 22:41)      SUBJECTIVE / OVERNIGHT EVENTS:    no acute events o/n. Patient reports that cough has improved and is no longer productive. She denies dyspnea and chest pain.     MEDICATIONS  (STANDING):  enoxaparin Injectable 40milliGRAM(s) SubCutaneous every 24 hours  famotidine    Tablet 20milliGRAM(s) Oral two times a day  piperacillin/tazobactam IVPB. 3.375Gram(s) IV Intermittent every 8 hours  predniSONE   Tablet 40milliGRAM(s) Oral daily  ALBUTerol/ipratropium for Nebulization 3milliLiter(s) Nebulizer every 6 hours  guaiFENesin    Syrup 200milliGRAM(s) Oral every 6 hours  artificial  tears Solution 1Drop(s) Both EYES three times a day  potassium acid phosphate/sodium acid phosphate tablet (K-PHOS No. 2) 1Tablet(s) Oral four times a day with meals  vancomycin  IVPB 1250milliGRAM(s) IV Intermittent every 12 hours    MEDICATIONS  (PRN):  acetaminophen   Tablet 650milliGRAM(s) Oral every 6 hours PRN For Temp greater than 38 C (100.4 F)      Vital Signs Last 24 Hrs  T(C): 36.3, Max: 36.7 (06-21 @ 12:12)  HR: 81 (81 - 91)  BP: 127/75 (126/70 - 135/79)  RR: 18 (18 - 18)  SpO2: 94% (93% - 97%)  Wt(kg): --  CAPILLARY BLOOD GLUCOSE    I&O's Summary    I & Os for current day (as of 2017 07:56)  =============================================  IN: 1490 ml / OUT: 400 ml / NET: 1090 ml      PHYSICAL EXAM:  GENERAL: NAD, well-developed  HEAD:  Atraumatic, Normocephalic  EYES: EOMI, PERRLA, conjunctiva and sclera clear  NECK: Supple, No JVD  CHEST/LUNG: expiratory wheezing and scattered rhonchi  HEART: Regular rate and rhythm; No murmurs, rubs, or gallops  ABDOMEN: Soft, Nontender, Nondistended; Bowel sounds present  EXTREMITIES:  2+ Peripheral Pulses, No clubbing, cyanosis, or edema  PSYCH: AAOx3  NEUROLOGY: non-focal  SKIN: No rashes or lesions    LABS:                        12.4   11.9  )-----------( 222      ( 2017 05:04 )             35.5     06-22    142  |  107  |  15  ----------------------------<  119<H>  4.2   |  27  |  0.65    Ca    8.3<L>      2017 05:04  Phos  2.7     06-  Mg     2.0     06-    TPro  7.6  /  Alb  4.0  /  TBili  0.7  /  DBili  x   /  AST  33  /  ALT  42  /  AlkPhos  49  06-20    PT/INR - ( 2017 12:04 )   PT: 12.2 sec;   INR: 1.12 ratio         PTT - ( 2017 12:04 )  PTT:27.6 sec  CARDIAC MARKERS ( 2017 12:04 )  x     / <0.01 ng/mL / 108 U/L / x     / 2.9 ng/mL      Urinalysis Basic - ( 2017 13:40 )    Color: PL Yellow / Appearance: SL Turbid / S.009 / pH: x  Gluc: x / Ketone: Trace  / Bili: Negative / Urobili: Negative   Blood: x / Protein: Trace / Nitrite: Negative   Leuk Esterase: Negative / RBC: x / WBC 2-5 /HPF   Sq Epi: x / Non Sq Epi: Occasional /HPF / Bacteria: Few /HPF        RADIOLOGY & ADDITIONAL TESTS:    Imaging Personally Reviewed:    Consultant(s) Notes Reviewed:      Care Discussed with Consultants/Other Providers:

## 2017-06-22 NOTE — PROGRESS NOTE ADULT - PROBLEM SELECTOR PLAN 3
Patient had episode of SVT in setting of sepsis now resolved. no further events on tele  - c/w telemetry for 24 hours Patient had episode of SVT in setting of sepsis now resolved. continues to be in sinus rhythm on tele  - will d/c tele today

## 2017-06-22 NOTE — PROGRESS NOTE ADULT - PROBLEM SELECTOR PLAN 1
Patient a/w post viral HCAP now with sepsis resolved. urine legionella antigen negative. azithromycin discontinued yesterday.  - c/w vancomycin, zosyn  - f/u cultures  - c/w robitussin Patient a/w post viral HCAP now with sepsis resolved. urine legionella antigen negative. azithromycin discontinued yesterday.  - c/w vancomycin, zosyn (day 2)  - f/u cultures  - c/w robitussin

## 2017-06-23 LAB
ANION GAP SERPL CALC-SCNC: 13 MMOL/L — SIGNIFICANT CHANGE UP (ref 5–17)
BUN SERPL-MCNC: 9 MG/DL — SIGNIFICANT CHANGE UP (ref 7–23)
CALCIUM SERPL-MCNC: 8.9 MG/DL — SIGNIFICANT CHANGE UP (ref 8.4–10.5)
CHLORIDE SERPL-SCNC: 103 MMOL/L — SIGNIFICANT CHANGE UP (ref 96–108)
CO2 SERPL-SCNC: 28 MMOL/L — SIGNIFICANT CHANGE UP (ref 22–31)
CREAT SERPL-MCNC: 0.65 MG/DL — SIGNIFICANT CHANGE UP (ref 0.5–1.3)
GLUCOSE SERPL-MCNC: 88 MG/DL — SIGNIFICANT CHANGE UP (ref 70–99)
HCT VFR BLD CALC: 41.4 % — SIGNIFICANT CHANGE UP (ref 34.5–45)
HGB BLD-MCNC: 13.7 G/DL — SIGNIFICANT CHANGE UP (ref 11.5–15.5)
MAGNESIUM SERPL-MCNC: 2.1 MG/DL — SIGNIFICANT CHANGE UP (ref 1.6–2.6)
MCHC RBC-ENTMCNC: 32.4 PG — SIGNIFICANT CHANGE UP (ref 27–34)
MCHC RBC-ENTMCNC: 33.1 GM/DL — SIGNIFICANT CHANGE UP (ref 32–36)
MCV RBC AUTO: 97.8 FL — SIGNIFICANT CHANGE UP (ref 80–100)
PHOSPHATE SERPL-MCNC: 2.5 MG/DL — SIGNIFICANT CHANGE UP (ref 2.5–4.5)
PLATELET # BLD AUTO: 235 K/UL — SIGNIFICANT CHANGE UP (ref 150–400)
POTASSIUM SERPL-MCNC: 4.2 MMOL/L — SIGNIFICANT CHANGE UP (ref 3.5–5.3)
POTASSIUM SERPL-SCNC: 4.2 MMOL/L — SIGNIFICANT CHANGE UP (ref 3.5–5.3)
RBC # BLD: 4.24 M/UL — SIGNIFICANT CHANGE UP (ref 3.8–5.2)
RBC # FLD: 12.1 % — SIGNIFICANT CHANGE UP (ref 10.3–14.5)
SODIUM SERPL-SCNC: 144 MMOL/L — SIGNIFICANT CHANGE UP (ref 135–145)
WBC # BLD: 8.6 K/UL — SIGNIFICANT CHANGE UP (ref 3.8–10.5)
WBC # FLD AUTO: 8.6 K/UL — SIGNIFICANT CHANGE UP (ref 3.8–10.5)

## 2017-06-23 PROCEDURE — 99233 SBSQ HOSP IP/OBS HIGH 50: CPT

## 2017-06-23 RX ADMIN — ENOXAPARIN SODIUM 40 MILLIGRAM(S): 100 INJECTION SUBCUTANEOUS at 22:37

## 2017-06-23 RX ADMIN — Medication 40 MILLIGRAM(S): at 05:36

## 2017-06-23 RX ADMIN — Medication 166.67 MILLIGRAM(S): at 12:26

## 2017-06-23 RX ADMIN — Medication 1 DROP(S): at 22:37

## 2017-06-23 RX ADMIN — FAMOTIDINE 20 MILLIGRAM(S): 10 INJECTION INTRAVENOUS at 05:36

## 2017-06-23 RX ADMIN — Medication 3 MILLILITER(S): at 12:26

## 2017-06-23 RX ADMIN — FAMOTIDINE 20 MILLIGRAM(S): 10 INJECTION INTRAVENOUS at 17:48

## 2017-06-23 RX ADMIN — Medication 1 DROP(S): at 05:36

## 2017-06-23 RX ADMIN — Medication 166.67 MILLIGRAM(S): at 22:37

## 2017-06-23 RX ADMIN — Medication 200 MILLIGRAM(S): at 05:36

## 2017-06-23 RX ADMIN — Medication 1 DROP(S): at 15:05

## 2017-06-23 RX ADMIN — PIPERACILLIN AND TAZOBACTAM 25 GRAM(S): 4; .5 INJECTION, POWDER, LYOPHILIZED, FOR SOLUTION INTRAVENOUS at 05:36

## 2017-06-23 RX ADMIN — Medication 3 MILLILITER(S): at 05:36

## 2017-06-23 RX ADMIN — Medication 3 MILLILITER(S): at 17:48

## 2017-06-23 NOTE — PROGRESS NOTE ADULT - SUBJECTIVE AND OBJECTIVE BOX
Patient is a 81y old  Female who presents with a chief complaint of SOB, COPD (20 Jun 2017 22:41)      SUBJECTIVE / OVERNIGHT EVENTS:    no acute events o/n. Patient reports that her cough worsens with robitussin. Not productive. Some dyspnea with ambulation but no chest pain.     MEDICATIONS  (STANDING):  enoxaparin Injectable 40milliGRAM(s) SubCutaneous every 24 hours  famotidine    Tablet 20milliGRAM(s) Oral two times a day  piperacillin/tazobactam IVPB. 3.375Gram(s) IV Intermittent every 8 hours  predniSONE   Tablet 40milliGRAM(s) Oral daily  ALBUTerol/ipratropium for Nebulization 3milliLiter(s) Nebulizer every 6 hours  guaiFENesin    Syrup 200milliGRAM(s) Oral every 6 hours  artificial  tears Solution 1Drop(s) Both EYES three times a day  vancomycin  IVPB 1250milliGRAM(s) IV Intermittent every 12 hours    MEDICATIONS  (PRN):  acetaminophen   Tablet 650milliGRAM(s) Oral every 6 hours PRN For Temp greater than 38 C (100.4 F)      Vital Signs Last 24 Hrs  T(C): 36.7, Max: 36.8 (06-22 @ 12:28)  HR: 84 (69 - 86)  BP: 138/83 (128/66 - 158/75)  RR: 18 (17 - 18)  SpO2: 94% (92% - 94%)  Wt(kg): --  CAPILLARY BLOOD GLUCOSE    I&O's Summary    I & Os for current day (as of 23 Jun 2017 08:00)  =============================================  IN: 1550 ml / OUT: 200 ml / NET: 1350 ml      PHYSICAL EXAM:  GENERAL: NAD, well-developed  HEAD:  Atraumatic, Normocephalic  EYES: EOMI, PERRLA, conjunctiva and sclera clear  NECK: Supple, No JVD  CHEST/LUNG: mild expiratory wheezing, scattered rhonchi  HEART: Regular rate and rhythm; No murmurs, rubs, or gallops  ABDOMEN: Soft, Nontender, Nondistended; Bowel sounds present  EXTREMITIES:  2+ Peripheral Pulses, No clubbing, cyanosis, or edema  PSYCH: AAOx3  NEUROLOGY: non-focal  SKIN: No rashes or lesions    LABS:                        13.7   8.6   )-----------( 235      ( 23 Jun 2017 07:05 )             41.4     06-23    144  |  103  |  9   ----------------------------<  88  4.2   |  28  |  0.65    Ca    8.9      23 Jun 2017 07:05  Phos  2.5     06-23  Mg     2.1     06-23                RADIOLOGY & ADDITIONAL TESTS:    Imaging Personally Reviewed:    Consultant(s) Notes Reviewed:      Care Discussed with Consultants/Other Providers:

## 2017-06-23 NOTE — DISCHARGE NOTE ADULT - MEDICATION SUMMARY - MEDICATIONS TO STOP TAKING
I will STOP taking the medications listed below when I get home from the hospital:    valsartan 40 mg oral tablet  -- 1 tab(s) by mouth once a day

## 2017-06-23 NOTE — DISCHARGE NOTE ADULT - PLAN OF CARE
Resolution When you came to the hospital, it was found that you have sepsis, meaning you had an infection that was causing abnormalities in your vital signs and blood work. The infection that was causing the sepsis was pneumonia. You were treated with antibiotics and improved. Please follow up with your primary care physician in one to two weeks after discharge. Please return to the ER if you have persistent fever, chest pain, difficulty breathing, worsening cough, or any other persistent symptoms. When you came to the hospital, it was found that you have pneumonia. This can happen after a viral infection, as in your case, after having the flu. You were treated with intravenous antibiotics in the hospital and improved. Please follow up with your primary care physician and return to the ER if you have worsening symptoms. Pulmonary Follow up You have a history of COPD. When you came to the hospital, it was found that you had a COPD exacerbation because you were wheezing. You were treated with steroids and improved. You will need to follow up with a pulmonologist as an outpatient. The information for the pulmonologist is listed below. Please call and make an appointment within one week after discharge. Please continue taking your home inhalers and medications as prescribed. Medication Compliance You have a history of high blood pressure. Please continue taking your home medications as prescribed and follow up routinely with your primary care physician for hypertension management. You have a history of asthma. Please continue taking your home inhalers and medications as prescribed. You will need to see a pulmonologist as an outpatient. The information for a pulmonologist is listed below. Please call and make an appointment within one week after discharge.

## 2017-06-23 NOTE — PROGRESS NOTE ADULT - PROBLEM SELECTOR PLAN 3
Patient had episode of SVT in setting of sepsis now resolved. Asymptomatic PAT 8 seconds o/n  - monitor

## 2017-06-23 NOTE — PROGRESS NOTE ADULT - PROBLEM SELECTOR PLAN 6
- full code  - anticipate discharge home when wheezing improves and patient is transitioned to oral antibiotics

## 2017-06-23 NOTE — DISCHARGE NOTE ADULT - SECONDARY DIAGNOSIS.
HCAP (healthcare-associated pneumonia) COPD exacerbation Essential hypertension Uncomplicated asthma, unspecified asthma severity

## 2017-06-23 NOTE — PROGRESS NOTE ADULT - PROBLEM SELECTOR PLAN 2
Patient wheezing on physical exam but improved  - continue with Prednisone 40mg x 5 days  - Duonebs q6h

## 2017-06-23 NOTE — PROGRESS NOTE ADULT - PROBLEM SELECTOR PLAN 1
Patient a/w post viral HCAP now with sepsis resolved. clinically improving but still with some rhonchi and mild expiratory wheezing  - c/w vancomycin, zosyn (day 4)  - f/u cultures  - c/w robitussin

## 2017-06-23 NOTE — DISCHARGE NOTE ADULT - MEDICATION SUMMARY - MEDICATIONS TO TAKE
I will START or STAY ON the medications listed below when I get home from the hospital:    predniSONE 20 mg oral tablet  -- Take 1.5 tabs daily by mouth for 1 day. Take 1 tab daily by mouth from 6/28-6/30. Take .5 tablet by mouth from 7/1-7/3  -- It is very important that you take or use this exactly as directed.  Do not skip doses or discontinue unless directed by your doctor.  Obtain medical advice before taking any non-prescription drugs as some may affect the action of this medication.  Take with food or milk.    -- Indication: For COPD exacerbation    albuterol 90 mcg/inh inhalation aerosol  -- 2 puff(s) inhaled every 6 hours, AS NEEDED, Shortness of Breath and/or Wheezing MDD:DISPENSE ONE INHALER  -- Indication: For COPD exacerbation    Tudorza Pressair 400 mcg/inh inhalation powder  -- 1 inhaler(s) inhaled once a day MDD:DISPENSE ONE INHALER  -- Indication: For COPD    Symbicort 160 mcg-4.5 mcg/inh inhalation aerosol  -- 2 puff(s) inhaled 2 times a day MDD:DISPENSE ONE INHALER  -- Indication: For COPD     amLODIPine 5 mg oral tablet  -- 1 tab(s) by mouth once a day  -- Indication: For HTN    famotidine 20 mg oral tablet  -- 1 tab(s) by mouth 2 times a day  -- Indication: For GERD    fluticasone 50 mcg/inh nasal spray  -- 1 spray(s) into nose once a day  -- Indication: For Seasonal allergies    ocular lubricant ophthalmic solution  -- 1 drop(s) to each affected eye 3 times a day  -- Indication: For Dry eyes

## 2017-06-23 NOTE — PROVIDER CONTACT NOTE (OTHER) - ACTION/TREATMENT ORDERED:
MD made aware. No further actions at this time. Continue to monitor patient.
MD notified and aware. Will continue to monitor

## 2017-06-23 NOTE — DISCHARGE NOTE ADULT - HOSPITAL COURSE
Patient admitted to medicine for further care and evaluation. Patient was treated with vanc and zosyn x5 days for HCAP treatment. Patient had COPD exacerbation. Treated with prednisone and improved. Blood cultures were NGTD. On the day of discharge, patient clinically improved and she was hemodynamically stable. Patient admitted to medicine for further care and evaluation. Patient was treated with vanc and zosyn x5 days for HCAP treatment. Patient had COPD exacerbation. Treated with prednisone and improved. Blood cultures were NGTD. On the day of discharge, patient clinically improved and she was hemodynamically stable. Pt will be discharged on PO prednisone taper given recent COPD exacerbation Patient admitted to medicine for further care and evaluation. Patient was treated with vanc and zosyn x5 days for HCAP treatment. Patient had COPD exacerbation. Treated with prednisone and improved. Blood cultures were NGTD. On the day of discharge, patient clinically improved and she was hemodynamically stable. Pt will be discharged on PO prednisone taper given recent COPD exacerbation. Pt was given number of primary care practice closer to her in [facility]. Patient admitted to medicine for further care and evaluation. Patient was treated with vanc and zosyn x5 days for HCAP treatment. Patient had COPD exacerbation. Treated with prednisone and improved. Blood cultures were NGTD. On the day of discharge, patient clinically improved and she was hemodynamically stable. Pt will be discharged on PO prednisone taper given recent COPD exacerbation. Pt was given number of primary care practice closer to her in [place] ([place], [place]).

## 2017-06-23 NOTE — DISCHARGE NOTE ADULT - PATIENT PORTAL LINK FT
“You can access the Follow[facility] Patient Portal, offered by [facility], by registering with the following website: [email or web address]”

## 2017-06-23 NOTE — PROVIDER CONTACT NOTE (OTHER) - ASSESSMENT
Patient A&Ox4, VSS. HR 82, /75, RR 17, O2 saturation 93% on 2L NC. Patient denies chest pain, lightheadedness, or shortness of breath. Patient asymptomatic.

## 2017-06-23 NOTE — DISCHARGE NOTE ADULT - CARE PLAN
Principal Discharge DX:	Sepsis, due to unspecified organism  Goal:	Resolution  Instructions for follow-up, activity and diet:	When you came to the hospital, it was found that you have sepsis, meaning you had an infection that was causing abnormalities in your vital signs and blood work. The infection that was causing the sepsis was pneumonia. You were treated with antibiotics and improved. Please follow up with your primary care physician in one to two weeks after discharge. Please return to the ER if you have persistent fever, chest pain, difficulty breathing, worsening cough, or any other persistent symptoms.  Secondary Diagnosis:	HCAP (healthcare-associated pneumonia)  Goal:	Resolution  Instructions for follow-up, activity and diet:	When you came to the hospital, it was found that you have pneumonia. This can happen after a viral infection, as in your case, after having the flu. You were treated with intravenous antibiotics in the hospital and improved. Please follow up with your primary care physician and return to the ER if you have worsening symptoms.  Secondary Diagnosis:	COPD exacerbation  Goal:	Pulmonary Follow up  Instructions for follow-up, activity and diet:	You have a history of COPD. When you came to the hospital, it was found that you had a COPD exacerbation because you were wheezing. You were treated with steroids and improved. You will need to follow up with a pulmonologist as an outpatient. The information for the pulmonologist is listed below. Please call and make an appointment within one week after discharge. Please continue taking your home inhalers and medications as prescribed.  Secondary Diagnosis:	Essential hypertension  Goal:	Medication Compliance  Instructions for follow-up, activity and diet:	You have a history of high blood pressure. Please continue taking your home medications as prescribed and follow up routinely with your primary care physician for hypertension management.  Secondary Diagnosis:	Uncomplicated asthma, unspecified asthma severity  Goal:	Pulmonary Follow up  Instructions for follow-up, activity and diet:	You have a history of asthma. Please continue taking your home inhalers and medications as prescribed. You will need to see a pulmonologist as an outpatient. The information for a pulmonologist is listed below. Please call and make an appointment within one week after discharge.

## 2017-06-24 LAB
ANION GAP SERPL CALC-SCNC: 11 MMOL/L — SIGNIFICANT CHANGE UP (ref 5–17)
BUN SERPL-MCNC: 16 MG/DL — SIGNIFICANT CHANGE UP (ref 7–23)
CALCIUM SERPL-MCNC: 8.9 MG/DL — SIGNIFICANT CHANGE UP (ref 8.4–10.5)
CHLORIDE SERPL-SCNC: 102 MMOL/L — SIGNIFICANT CHANGE UP (ref 96–108)
CO2 SERPL-SCNC: 27 MMOL/L — SIGNIFICANT CHANGE UP (ref 22–31)
CREAT SERPL-MCNC: 0.58 MG/DL — SIGNIFICANT CHANGE UP (ref 0.5–1.3)
GLUCOSE SERPL-MCNC: 91 MG/DL — SIGNIFICANT CHANGE UP (ref 70–99)
HCT VFR BLD CALC: 45.9 % — HIGH (ref 34.5–45)
HGB BLD-MCNC: 14.6 G/DL — SIGNIFICANT CHANGE UP (ref 11.5–15.5)
MAGNESIUM SERPL-MCNC: 2.3 MG/DL — SIGNIFICANT CHANGE UP (ref 1.6–2.6)
MCHC RBC-ENTMCNC: 30.9 PG — SIGNIFICANT CHANGE UP (ref 27–34)
MCHC RBC-ENTMCNC: 31.8 GM/DL — LOW (ref 32–36)
MCV RBC AUTO: 97.2 FL — SIGNIFICANT CHANGE UP (ref 80–100)
PHOSPHATE SERPL-MCNC: 2.5 MG/DL — SIGNIFICANT CHANGE UP (ref 2.5–4.5)
PLATELET # BLD AUTO: 264 K/UL — SIGNIFICANT CHANGE UP (ref 150–400)
POTASSIUM SERPL-MCNC: 3.7 MMOL/L — SIGNIFICANT CHANGE UP (ref 3.5–5.3)
POTASSIUM SERPL-SCNC: 3.7 MMOL/L — SIGNIFICANT CHANGE UP (ref 3.5–5.3)
RBC # BLD: 4.72 M/UL — SIGNIFICANT CHANGE UP (ref 3.8–5.2)
RBC # FLD: 12.2 % — SIGNIFICANT CHANGE UP (ref 10.3–14.5)
SODIUM SERPL-SCNC: 140 MMOL/L — SIGNIFICANT CHANGE UP (ref 135–145)
WBC # BLD: 8 K/UL — SIGNIFICANT CHANGE UP (ref 3.8–10.5)
WBC # FLD AUTO: 8 K/UL — SIGNIFICANT CHANGE UP (ref 3.8–10.5)

## 2017-06-24 PROCEDURE — 99233 SBSQ HOSP IP/OBS HIGH 50: CPT

## 2017-06-24 RX ADMIN — FAMOTIDINE 20 MILLIGRAM(S): 10 INJECTION INTRAVENOUS at 05:50

## 2017-06-24 RX ADMIN — Medication 3 MILLILITER(S): at 13:26

## 2017-06-24 RX ADMIN — Medication 1 DROP(S): at 05:53

## 2017-06-24 RX ADMIN — Medication 3 MILLILITER(S): at 00:53

## 2017-06-24 RX ADMIN — Medication 166.67 MILLIGRAM(S): at 22:55

## 2017-06-24 RX ADMIN — ENOXAPARIN SODIUM 40 MILLIGRAM(S): 100 INJECTION SUBCUTANEOUS at 22:13

## 2017-06-24 RX ADMIN — Medication 166.67 MILLIGRAM(S): at 13:17

## 2017-06-24 RX ADMIN — Medication 3 MILLILITER(S): at 05:52

## 2017-06-24 RX ADMIN — FAMOTIDINE 20 MILLIGRAM(S): 10 INJECTION INTRAVENOUS at 18:29

## 2017-06-24 RX ADMIN — Medication 3 MILLILITER(S): at 20:17

## 2017-06-24 RX ADMIN — Medication 40 MILLIGRAM(S): at 05:50

## 2017-06-24 NOTE — PROGRESS NOTE ADULT - PROBLEM SELECTOR PLAN 1
- afebrile, not septic. clinically improving but still with some rhonchi and mild expiratory wheezing  - c/w vancomycin, zosyn (day 5, last day), bld cx NGTD  - c/w robitussin PRN - afebrile, not septic. clinically improving but still with expiratory wheezing  - c/w vancomycin, zosyn (day 5, last day), bld cx NGTD  - c/w robitussin PRN

## 2017-06-24 NOTE — PROGRESS NOTE ADULT - PROBLEM SELECTOR PLAN 2
- patient wheezing on physical exam but improved  - c/w prednisone 40mg x 5 days (d 4/5), duonebs q6h - patient wheezing on physical exam but improved overall  - c/w prednisone 40mg x 5 days (d 4/5), duonebs q6h

## 2017-06-24 NOTE — PROGRESS NOTE ADULT - PROBLEM SELECTOR PLAN 4
- Home norvasc, valsartan held in setting of sepsis, BP mildly elevated but acceptable. If persistently elevated will consider restarting home meds

## 2017-06-25 LAB
ANION GAP SERPL CALC-SCNC: 13 MMOL/L — SIGNIFICANT CHANGE UP (ref 5–17)
BUN SERPL-MCNC: 20 MG/DL — SIGNIFICANT CHANGE UP (ref 7–23)
CALCIUM SERPL-MCNC: 9.4 MG/DL — SIGNIFICANT CHANGE UP (ref 8.4–10.5)
CHLORIDE SERPL-SCNC: 101 MMOL/L — SIGNIFICANT CHANGE UP (ref 96–108)
CO2 SERPL-SCNC: 26 MMOL/L — SIGNIFICANT CHANGE UP (ref 22–31)
CREAT SERPL-MCNC: 0.64 MG/DL — SIGNIFICANT CHANGE UP (ref 0.5–1.3)
CULTURE RESULTS: SIGNIFICANT CHANGE UP
CULTURE RESULTS: SIGNIFICANT CHANGE UP
GLUCOSE SERPL-MCNC: 83 MG/DL — SIGNIFICANT CHANGE UP (ref 70–99)
HCT VFR BLD CALC: 44.1 % — SIGNIFICANT CHANGE UP (ref 34.5–45)
HGB BLD-MCNC: 15.1 G/DL — SIGNIFICANT CHANGE UP (ref 11.5–15.5)
MAGNESIUM SERPL-MCNC: 2.3 MG/DL — SIGNIFICANT CHANGE UP (ref 1.6–2.6)
MCHC RBC-ENTMCNC: 33.2 PG — SIGNIFICANT CHANGE UP (ref 27–34)
MCHC RBC-ENTMCNC: 34.3 GM/DL — SIGNIFICANT CHANGE UP (ref 32–36)
MCV RBC AUTO: 96.9 FL — SIGNIFICANT CHANGE UP (ref 80–100)
PHOSPHATE SERPL-MCNC: 2.9 MG/DL — SIGNIFICANT CHANGE UP (ref 2.5–4.5)
PLATELET # BLD AUTO: 259 K/UL — SIGNIFICANT CHANGE UP (ref 150–400)
POTASSIUM SERPL-MCNC: 3.8 MMOL/L — SIGNIFICANT CHANGE UP (ref 3.5–5.3)
POTASSIUM SERPL-SCNC: 3.8 MMOL/L — SIGNIFICANT CHANGE UP (ref 3.5–5.3)
RBC # BLD: 4.55 M/UL — SIGNIFICANT CHANGE UP (ref 3.8–5.2)
RBC # FLD: 12.2 % — SIGNIFICANT CHANGE UP (ref 10.3–14.5)
SODIUM SERPL-SCNC: 140 MMOL/L — SIGNIFICANT CHANGE UP (ref 135–145)
SPECIMEN SOURCE: SIGNIFICANT CHANGE UP
SPECIMEN SOURCE: SIGNIFICANT CHANGE UP
WBC # BLD: 8.7 K/UL — SIGNIFICANT CHANGE UP (ref 3.8–10.5)
WBC # FLD AUTO: 8.7 K/UL — SIGNIFICANT CHANGE UP (ref 3.8–10.5)

## 2017-06-25 PROCEDURE — 99232 SBSQ HOSP IP/OBS MODERATE 35: CPT | Mod: GC

## 2017-06-25 RX ADMIN — FAMOTIDINE 20 MILLIGRAM(S): 10 INJECTION INTRAVENOUS at 05:50

## 2017-06-25 RX ADMIN — Medication 3 MILLILITER(S): at 11:40

## 2017-06-25 RX ADMIN — Medication 1 DROP(S): at 21:42

## 2017-06-25 RX ADMIN — FAMOTIDINE 20 MILLIGRAM(S): 10 INJECTION INTRAVENOUS at 17:20

## 2017-06-25 RX ADMIN — ENOXAPARIN SODIUM 40 MILLIGRAM(S): 100 INJECTION SUBCUTANEOUS at 21:42

## 2017-06-25 RX ADMIN — Medication 3 MILLILITER(S): at 05:50

## 2017-06-25 RX ADMIN — Medication 1 DROP(S): at 13:03

## 2017-06-25 RX ADMIN — Medication 1 DROP(S): at 05:50

## 2017-06-25 RX ADMIN — Medication 30 MILLIGRAM(S): at 05:50

## 2017-06-25 RX ADMIN — Medication 3 MILLILITER(S): at 23:14

## 2017-06-25 RX ADMIN — Medication 3 MILLILITER(S): at 17:19

## 2017-06-25 NOTE — PROGRESS NOTE ADULT - PROBLEM SELECTOR PLAN 1
- afebrile, not septic. clinically improving but still with expiratory wheezing  - c/w vancomycin, zosyn (day 5, last day), bld cx NGTD  - c/w robitussin PRN - afebrile, not septic. clinically improving but still with expiratory wheezing over the right lower lobe  - c/w vancomycin, zosyn (day 5, last day), bld cx NGTD  - c/w robitussin PRN

## 2017-06-25 NOTE — PROGRESS NOTE ADULT - SUBJECTIVE AND OBJECTIVE BOX
Patient is a 81y old  Female who presents with a chief complaint of post viral PNA, COPD exacerbation (23 Jun 2017 16:23)      SUBJECTIVE / OVERNIGHT EVENTS:    MEDICATIONS  (STANDING):  enoxaparin Injectable 40milliGRAM(s) SubCutaneous every 24 hours  famotidine    Tablet 20milliGRAM(s) Oral two times a day  ALBUTerol/ipratropium for Nebulization 3milliLiter(s) Nebulizer every 6 hours  artificial  tears Solution 1Drop(s) Both EYES three times a day  predniSONE   Tablet  Oral   predniSONE   Tablet 30milliGRAM(s) Oral daily    MEDICATIONS  (PRN):  acetaminophen   Tablet 650milliGRAM(s) Oral every 6 hours PRN For Temp greater than 38 C (100.4 F)      Vital Signs Last 24 Hrs  T(C): 36.8, Max: 36.8 (06-25 @ 05:04)  HR: 75 (75 - 90)  BP: 149/75 (149/74 - 152/88)  RR: 18 (18 - 18)  SpO2: 95% (90% - 95%)  Wt(kg): --  CAPILLARY BLOOD GLUCOSE    I&O's Summary  I & Os for 24h ending 24 Jun 2017 07:00  =============================================  IN: 750 ml / OUT: 0 ml / NET: 750 ml    I & Os for current day (as of 25 Jun 2017 06:01)  =============================================  IN: 1340 ml / OUT: 0 ml / NET: 1340 ml      PHYSICAL EXAM:  GENERAL:   EYES:  NECK:   CHEST/LUNG:   HEART:   ABDOMEN:   EXTREMITIES:    PSYCH:   NEUROLOGY:   SKIN:    LABS:                        14.6   8.0   )-----------( 264      ( 24 Jun 2017 06:50 )             45.9     06-24    140  |  102  |  16  ----------------------------<  91  3.7   |  27  |  0.58    Ca    8.9      24 Jun 2017 06:50  Phos  2.5     06-24  Mg     2.3     06-24                RADIOLOGY & ADDITIONAL TESTS:    Imaging Personally Reviewed:  Yes    Consultant(s) Notes Reviewed:      Care Discussed with Consultants/Other Providers:    Assessment and Plan   PAST MEDICAL & SURGICAL HISTORY:  Chronic obstructive pulmonary disease, unspecified COPD type  HTN (hypertension)  Asthma  No significant past surgical history Patient is a 81y old  Female who presents with a chief complaint of post viral PNA, COPD exacerbation       SUBJECTIVE / OVERNIGHT EVENTS: Patient is examined. No acute events O/N. She endorses feeling better overall with decreased coughing    MEDICATIONS  (STANDING):  enoxaparin Injectable 40milliGRAM(s) SubCutaneous every 24 hours  famotidine    Tablet 20milliGRAM(s) Oral two times a day  ALBUTerol/ipratropium for Nebulization 3milliLiter(s) Nebulizer every 6 hours  artificial  tears Solution 1Drop(s) Both EYES three times a day  predniSONE   Tablet  Oral   predniSONE   Tablet 30milliGRAM(s) Oral daily    MEDICATIONS  (PRN):  acetaminophen   Tablet 650milliGRAM(s) Oral every 6 hours PRN For Temp greater than 38 C (100.4 F)      Vital Signs Last 24 Hrs  T(C): 36.8, Max: 36.8 (06-25 @ 05:04)  HR: 75 (75 - 90)  BP: 149/75 (149/74 - 152/88)  RR: 18 (18 - 18)  SpO2: 95% (90% - 95%)  Wt(kg): --  CAPILLARY BLOOD GLUCOSE    I&O's Summary  I & Os for 24h ending 24 Jun 2017 07:00  =============================================  IN: 750 ml / OUT: 0 ml / NET: 750 ml    I & Os for current day (as of 25 Jun 2017 06:01)  =============================================  IN: 1340 ml / OUT: 0 ml / NET: 1340 ml      PHYSICAL EXAM  GENERAL: NAD, well-developed  HEAD:  Atraumatic, Normocephalic  EYES: EOMI, PERRLA, conjunctiva and sclera clear  NECK: Supple, No JVD  CHEST/LUNG: expiratory wheeze heard over right lower lobe  HEART: Regular rate and rhythm; No murmurs, rubs, or gallops  ABDOMEN: Soft, Nontender, Nondistended; Bowel sounds present  EXTREMITIES:  2+ Peripheral Pulses, No clubbing, cyanosis, or edema  PSYCH: AAOx3  SKIN: No rashes or lesions      LABS:                        14.6   8.0   )-----------( 264      ( 24 Jun 2017 06:50 )             45.9     06-24    140  |  102  |  16  ----------------------------<  91  3.7   |  27  |  0.58    Ca    8.9      24 Jun 2017 06:50  Phos  2.5     06-24  Mg     2.3     06-24                RADIOLOGY & ADDITIONAL TESTS:    Imaging Personally Reviewed:     Consultant(s) Notes Reviewed:      Care Discussed with Consultants/Other Providers:    Assessment and Plan   PAST MEDICAL & SURGICAL HISTORY:  Chronic obstructive pulmonary disease, unspecified COPD type  HTN (hypertension)  Asthma  No significant past surgical history Patient is a 81y old  Female who presents with a chief complaint of post viral PNA, COPD exacerbation     Patient is examined. No acute events O/N. She endorses feeling better overall with decreased coughing.    MEDICATIONS  (STANDING):  enoxaparin Injectable 40milliGRAM(s) SubCutaneous every 24 hours  famotidine    Tablet 20milliGRAM(s) Oral two times a day  ALBUTerol/ipratropium for Nebulization 3milliLiter(s) Nebulizer every 6 hours  artificial  tears Solution 1Drop(s) Both EYES three times a day  predniSONE   Tablet  Oral   predniSONE   Tablet 30milliGRAM(s) Oral daily    MEDICATIONS  (PRN):  acetaminophen   Tablet 650milliGRAM(s) Oral every 6 hours PRN For Temp greater than 38 C (100.4 F)      Vital Signs Last 24 Hrs  T(C): 36.8, Max: 36.8 (06-25 @ 05:04)  HR: 75 (75 - 90)  BP: 149/75 (149/74 - 152/88)  RR: 18 (18 - 18)  SpO2: 95% (90% - 95%)  Wt(kg): --  CAPILLARY BLOOD GLUCOSE    I&O's Summary  I & Os for 24h ending 24 Jun 2017 07:00  =============================================  IN: 750 ml / OUT: 0 ml / NET: 750 ml    I & Os for current day (as of 25 Jun 2017 06:01)  =============================================  IN: 1340 ml / OUT: 0 ml / NET: 1340 ml      PHYSICAL EXAM  GENERAL: NAD, well-developed  HEAD:  Atraumatic, Normocephalic  EYES: EOMI, PERRLA, conjunctiva and sclera clear  NECK: Supple, No JVD  CHEST/LUNG: expiratory wheeze heard over right lower lobe  HEART: Regular rate and rhythm; No murmurs, rubs, or gallops  ABDOMEN: Soft, Nontender, Nondistended; Bowel sounds present  EXTREMITIES:  2+ Peripheral Pulses, No clubbing, cyanosis, or edema  PSYCH: AAOx3  SKIN: No rashes or lesions      LABS:                        14.6   8.0   )-----------( 264      ( 24 Jun 2017 06:50 )             45.9     06-24    140  |  102  |  16  ----------------------------<  91  3.7   |  27  |  0.58    Ca    8.9      24 Jun 2017 06:50  Phos  2.5     06-24  Mg     2.3     06-24                RADIOLOGY & ADDITIONAL TESTS:    Imaging Personally Reviewed:     Consultant(s) Notes Reviewed:      Care Discussed with Consultants/Other Providers:    Assessment and Plan   PAST MEDICAL & SURGICAL HISTORY:  Chronic obstructive pulmonary disease, unspecified COPD type  HTN (hypertension)  Asthma  No significant past surgical history Patient is a 81y old  Female who presents with a chief complaint of post viral PNA, COPD exacerbation     Patient is examined. No acute events O/N. She endorses feeling better overall with decreased coughing.    MEDICATIONS  (STANDING):  enoxaparin Injectable 40milliGRAM(s) SubCutaneous every 24 hours  famotidine    Tablet 20milliGRAM(s) Oral two times a day  ALBUTerol/ipratropium for Nebulization 3milliLiter(s) Nebulizer every 6 hours  artificial  tears Solution 1Drop(s) Both EYES three times a day  predniSONE   Tablet  Oral   predniSONE   Tablet 30milliGRAM(s) Oral daily    MEDICATIONS  (PRN):  acetaminophen   Tablet 650milliGRAM(s) Oral every 6 hours PRN For Temp greater than 38 C (100.4 F)      Vital Signs Last 24 Hrs  T(C): 36.8, Max: 36.8 (06-25 @ 05:04)  HR: 75 (75 - 90)  BP: 149/75 (149/74 - 152/88)  RR: 18 (18 - 18)  SpO2: 95% (90% - 95%)  Wt(kg): --  CAPILLARY BLOOD GLUCOSE    I&O's Summary  I & Os for 24h ending 24 Jun 2017 07:00  =============================================  IN: 750 ml / OUT: 0 ml / NET: 750 ml    I & Os for current day (as of 25 Jun 2017 06:01)  =============================================  IN: 1340 ml / OUT: 0 ml / NET: 1340 ml      PHYSICAL EXAM  GENERAL: NAD, well-developed  HEAD:  Atraumatic, Normocephalic  EYES: EOMI, PERRLA, conjunctiva and sclera clear  NECK: Supple, No JVD  CHEST/LUNG: expiratory wheeze heard over right lower lobe  HEART: Regular rate and rhythm; No murmurs, rubs, or gallops  ABDOMEN: Soft, Nontender, Nondistended; Bowel sounds present  EXTREMITIES:  2+ Peripheral Pulses, No clubbing, cyanosis, or edema  PSYCH: AAOx3  SKIN: No rashes or lesions      LABS:                        14.6   8.0   )-----------( 264      ( 24 Jun 2017 06:50 )             45.9     06-24    140  |  102  |  16  ----------------------------<  91  3.7   |  27  |  0.58    Ca    8.9      24 Jun 2017 06:50  Phos  2.5     06-24  Mg     2.3     06-24        RADIOLOGY & ADDITIONAL TESTS:    Imaging Personally Reviewed:     Consultant(s) Notes Reviewed:      Care Discussed with Consultants/Other Providers:    Assessment and Plan   PAST MEDICAL & SURGICAL HISTORY:  Chronic obstructive pulmonary disease, unspecified COPD type  HTN (hypertension)  Asthma  No significant past surgical history

## 2017-06-26 VITALS
HEART RATE: 57 BPM | TEMPERATURE: 98 F | OXYGEN SATURATION: 97 % | RESPIRATION RATE: 18 BRPM | DIASTOLIC BLOOD PRESSURE: 73 MMHG | SYSTOLIC BLOOD PRESSURE: 122 MMHG

## 2017-06-26 LAB
ANION GAP SERPL CALC-SCNC: 15 MMOL/L — SIGNIFICANT CHANGE UP (ref 5–17)
BUN SERPL-MCNC: 23 MG/DL — SIGNIFICANT CHANGE UP (ref 7–23)
CALCIUM SERPL-MCNC: 9.7 MG/DL — SIGNIFICANT CHANGE UP (ref 8.4–10.5)
CHLORIDE SERPL-SCNC: 100 MMOL/L — SIGNIFICANT CHANGE UP (ref 96–108)
CO2 SERPL-SCNC: 24 MMOL/L — SIGNIFICANT CHANGE UP (ref 22–31)
CREAT SERPL-MCNC: 0.66 MG/DL — SIGNIFICANT CHANGE UP (ref 0.5–1.3)
GLUCOSE SERPL-MCNC: 87 MG/DL — SIGNIFICANT CHANGE UP (ref 70–99)
HCT VFR BLD CALC: 44.7 % — SIGNIFICANT CHANGE UP (ref 34.5–45)
HGB BLD-MCNC: 15 G/DL — SIGNIFICANT CHANGE UP (ref 11.5–15.5)
MAGNESIUM SERPL-MCNC: 2.3 MG/DL — SIGNIFICANT CHANGE UP (ref 1.6–2.6)
MCHC RBC-ENTMCNC: 32.7 PG — SIGNIFICANT CHANGE UP (ref 27–34)
MCHC RBC-ENTMCNC: 33.6 GM/DL — SIGNIFICANT CHANGE UP (ref 32–36)
MCV RBC AUTO: 97.4 FL — SIGNIFICANT CHANGE UP (ref 80–100)
PHOSPHATE SERPL-MCNC: 3.6 MG/DL — SIGNIFICANT CHANGE UP (ref 2.5–4.5)
PLATELET # BLD AUTO: 250 K/UL — SIGNIFICANT CHANGE UP (ref 150–400)
POTASSIUM SERPL-MCNC: 4.3 MMOL/L — SIGNIFICANT CHANGE UP (ref 3.5–5.3)
POTASSIUM SERPL-SCNC: 4.3 MMOL/L — SIGNIFICANT CHANGE UP (ref 3.5–5.3)
RBC # BLD: 4.6 M/UL — SIGNIFICANT CHANGE UP (ref 3.8–5.2)
RBC # FLD: 12.3 % — SIGNIFICANT CHANGE UP (ref 10.3–14.5)
SODIUM SERPL-SCNC: 139 MMOL/L — SIGNIFICANT CHANGE UP (ref 135–145)
WBC # BLD: 8 K/UL — SIGNIFICANT CHANGE UP (ref 3.8–10.5)
WBC # FLD AUTO: 8 K/UL — SIGNIFICANT CHANGE UP (ref 3.8–10.5)

## 2017-06-26 PROCEDURE — 84295 ASSAY OF SERUM SODIUM: CPT

## 2017-06-26 PROCEDURE — 96375 TX/PRO/DX INJ NEW DRUG ADDON: CPT

## 2017-06-26 PROCEDURE — 71275 CT ANGIOGRAPHY CHEST: CPT

## 2017-06-26 PROCEDURE — 82550 ASSAY OF CK (CPK): CPT

## 2017-06-26 PROCEDURE — 87633 RESP VIRUS 12-25 TARGETS: CPT

## 2017-06-26 PROCEDURE — 87040 BLOOD CULTURE FOR BACTERIA: CPT

## 2017-06-26 PROCEDURE — 93005 ELECTROCARDIOGRAM TRACING: CPT

## 2017-06-26 PROCEDURE — 84443 ASSAY THYROID STIM HORMONE: CPT

## 2017-06-26 PROCEDURE — 85014 HEMATOCRIT: CPT

## 2017-06-26 PROCEDURE — 85610 PROTHROMBIN TIME: CPT

## 2017-06-26 PROCEDURE — 80048 BASIC METABOLIC PNL TOTAL CA: CPT

## 2017-06-26 PROCEDURE — 83605 ASSAY OF LACTIC ACID: CPT

## 2017-06-26 PROCEDURE — 82947 ASSAY GLUCOSE BLOOD QUANT: CPT

## 2017-06-26 PROCEDURE — 94640 AIRWAY INHALATION TREATMENT: CPT

## 2017-06-26 PROCEDURE — 84145 PROCALCITONIN (PCT): CPT

## 2017-06-26 PROCEDURE — 85027 COMPLETE CBC AUTOMATED: CPT

## 2017-06-26 PROCEDURE — 84100 ASSAY OF PHOSPHORUS: CPT

## 2017-06-26 PROCEDURE — 87486 CHLMYD PNEUM DNA AMP PROBE: CPT

## 2017-06-26 PROCEDURE — 87449 NOS EACH ORGANISM AG IA: CPT

## 2017-06-26 PROCEDURE — 82330 ASSAY OF CALCIUM: CPT

## 2017-06-26 PROCEDURE — 83735 ASSAY OF MAGNESIUM: CPT

## 2017-06-26 PROCEDURE — 96374 THER/PROPH/DIAG INJ IV PUSH: CPT

## 2017-06-26 PROCEDURE — 99285 EMERGENCY DEPT VISIT HI MDM: CPT | Mod: 25

## 2017-06-26 PROCEDURE — 80053 COMPREHEN METABOLIC PANEL: CPT

## 2017-06-26 PROCEDURE — 82803 BLOOD GASES ANY COMBINATION: CPT

## 2017-06-26 PROCEDURE — 81001 URINALYSIS AUTO W/SCOPE: CPT

## 2017-06-26 PROCEDURE — 71046 X-RAY EXAM CHEST 2 VIEWS: CPT

## 2017-06-26 PROCEDURE — 82553 CREATINE MB FRACTION: CPT

## 2017-06-26 PROCEDURE — 87581 M.PNEUMON DNA AMP PROBE: CPT

## 2017-06-26 PROCEDURE — 87086 URINE CULTURE/COLONY COUNT: CPT

## 2017-06-26 PROCEDURE — 82435 ASSAY OF BLOOD CHLORIDE: CPT

## 2017-06-26 PROCEDURE — 87798 DETECT AGENT NOS DNA AMP: CPT

## 2017-06-26 PROCEDURE — 84484 ASSAY OF TROPONIN QUANT: CPT

## 2017-06-26 PROCEDURE — 80202 ASSAY OF VANCOMYCIN: CPT

## 2017-06-26 PROCEDURE — 99239 HOSP IP/OBS DSCHRG MGMT >30: CPT

## 2017-06-26 PROCEDURE — 84132 ASSAY OF SERUM POTASSIUM: CPT

## 2017-06-26 PROCEDURE — 85730 THROMBOPLASTIN TIME PARTIAL: CPT

## 2017-06-26 PROCEDURE — 83880 ASSAY OF NATRIURETIC PEPTIDE: CPT

## 2017-06-26 RX ORDER — ACLIDINIUM BROMIDE 400 UG/1
1 POWDER, METERED RESPIRATORY (INHALATION)
Qty: 1 | Refills: 0 | OUTPATIENT
Start: 2017-06-26 | End: 2017-07-26

## 2017-06-26 RX ORDER — AMLODIPINE BESYLATE 2.5 MG/1
5 TABLET ORAL DAILY
Qty: 0 | Refills: 0 | Status: DISCONTINUED | OUTPATIENT
Start: 2017-06-26 | End: 2017-06-26

## 2017-06-26 RX ADMIN — AMLODIPINE BESYLATE 5 MILLIGRAM(S): 2.5 TABLET ORAL at 13:00

## 2017-06-26 RX ADMIN — Medication 1 DROP(S): at 05:03

## 2017-06-26 RX ADMIN — Medication 3 MILLILITER(S): at 13:01

## 2017-06-26 RX ADMIN — FAMOTIDINE 20 MILLIGRAM(S): 10 INJECTION INTRAVENOUS at 05:03

## 2017-06-26 RX ADMIN — Medication 1 DROP(S): at 13:02

## 2017-06-26 RX ADMIN — Medication 30 MILLIGRAM(S): at 05:03

## 2017-06-26 RX ADMIN — Medication 3 MILLILITER(S): at 05:03

## 2017-06-26 NOTE — PROGRESS NOTE ADULT - ATTENDING COMMENTS
81F with h/o COPD/asthma, HTN recent admission for Influenza a/w sepsis 2/2 post viral HCAP.  Has been wheezing, remained afebrile.    1. HCAP ( Likely staph or gm negative), resolved sepsis-  - Afebrile, still wheezing but less. WBC 8, O2 sat 94% in RA.  - IV Zosyn/Vanco. d/c'd Azithro as urine legionella Ag -ve.    2. COPD/asthma, acute exacerbation-  Likely 2/2 Pneumonia, postviral. CT chest reviewed.   - c/w bronchodilators, systemic steroid.O2 (has home O2)    3. PT
dc planning tomorrow
81F with h/o COPD/asthma, HTN recent admission for Influenza a/w sepsis 2/2 post viral HCAP.  Has been wheezing, remained afebrile.    1. HCAP ( Likely staph or gm negative), resolved sepsis-  - Afebrile, still wheezing but less. WBC - wnl, O2 sat 94% in RA.  - IV Vanco. d/c'd Azithro/zosyn. Blood c/s -ve    2. COPD/asthma, acute exacerbation-  Likely 2/2 postviral Pna. CT chest reviewed.   - c/w bronchodilators, systemic steroid and O2 (has home O2)    3. PT. d/c planning in 2 days with home O2
The patient is clinically improved and is stable for discharge to home today.  D/c time spent 40 minutes.
81F with h/o COPD/asthma, HTN recent admission for Influenza a/w sepsis 2/2 post viral HCAP.  Has been wheezing, remained afebrile.    1. HCAP ( Likely staph or gm negative), resolved sepsis-  - VSS, WBC 11, O2 sat 94% in RA, still has been wheezing but improved.  - IV Zosyn/Vanco. d/c'd Azithro as urine legionella Ag -ve.    2. COPD/asthma, acute exacerbation-  Likely 2/2 Pneumonia, postviral. CT chest reviewed.   - c/w bronchodilators, systemic steroid.     3. PT
81F with h/o COPD/asthma, HTN recent admission for Influenza a/w sepsis 2/2 post viral HCAP.  Has been wheezing, remained afebrile.    1/ Sepsis 2/2 HCAP ( Likely staph or gm negative)  - VSS, Has been wheezing but afebrile. O2 sat 93% with Oxygen  - IV Zosyn/Vanc/Azithro, -f/u blood c/s, urine legionella Ag    2. COPD/asthma, acute exacerbation-  Likely for Pneumonia, postviral. Has been afebrile. CT chest reviewed.   - c/w bronchodilators, systemic steroid

## 2017-06-26 NOTE — PROGRESS NOTE ADULT - SUBJECTIVE AND OBJECTIVE BOX
Patient is a 81y old  Female who presents with a chief complaint of post viral PNA, COPD exacerbation         SUBJECTIVE / OVERNIGHT EVENTS:  Patient was examined. Overnight the patient have a 5.3sec _____ while asleep and had tachycardia to 131.       MEDICATIONS  (STANDING):  enoxaparin Injectable 40milliGRAM(s) SubCutaneous every 24 hours  famotidine    Tablet 20milliGRAM(s) Oral two times a day  ALBUTerol/ipratropium for Nebulization 3milliLiter(s) Nebulizer every 6 hours  artificial  tears Solution 1Drop(s) Both EYES three times a day  predniSONE   Tablet  Oral   predniSONE   Tablet 30milliGRAM(s) Oral daily  amLODIPine   Tablet 5milliGRAM(s) Oral daily    MEDICATIONS  (PRN):  acetaminophen   Tablet 650milliGRAM(s) Oral every 6 hours PRN For Temp greater than 38 C (100.4 F)      Vital Signs Last 24 Hrs  T(C): 36.5, Max: 36.9 (06-25 @ 21:02)  HR: 79 (68 - 79)  BP: 147/79 (146/77 - 155/77)  RR: 18 (18 - 18)  SpO2: 94% (92% - 94%)  Wt(kg): --  CAPILLARY BLOOD GLUCOSE    I&O's Summary  I & Os for 24h ending 26 Jun 2017 07:00  =============================================  IN: 860 ml / OUT: 0 ml / NET: 860 ml    I & Os for current day (as of 26 Jun 2017 12:50)  =============================================  IN: 420 ml / OUT: 0 ml / NET: 420 ml      PHYSICAL EXAM  GENERAL: NAD, well-developed  HEAD:  Atraumatic, Normocephalic  EYES: EOMI, PERRLA, conjunctiva and sclera clear  NECK: Supple, No JVD  CHEST/LUNG: CTAB  HEART: Regular rate and rhythm; No murmurs, rubs, or gallops  ABDOMEN: Soft, Nontender, Nondistended; Bowel sounds present  EXTREMITIES:  2+ Peripheral Pulses, No clubbing, cyanosis, or edema  PSYCH: AAOx3  SKIN: No rashes or lesions      LABS:                        15.0   8.0   )-----------( 250      ( 26 Jun 2017 06:49 )             44.7     06-26    139  |  100  |  23  ----------------------------<  87  4.3   |  24  |  0.66    Ca    9.7      26 Jun 2017 06:49  Phos  3.6     06-26  Mg     2.3     06-26            RADIOLOGY & ADDITIONAL TESTS:    Imaging Personally Reviewed:  Consultant(s) Notes Reviewed:    Care Discussed with Consultants/Other Providers: Patient is a 81y old  Female who presents with a chief complaint of post viral PNA, COPD exacerbation         SUBJECTIVE / OVERNIGHT EVENTS:  Patient was examined. Overnight the patient have a 5.3sec episode of paroxysmal atrial tachycardia with heart rate up to 131.       MEDICATIONS  (STANDING):  enoxaparin Injectable 40milliGRAM(s) SubCutaneous every 24 hours  famotidine    Tablet 20milliGRAM(s) Oral two times a day  ALBUTerol/ipratropium for Nebulization 3milliLiter(s) Nebulizer every 6 hours  artificial  tears Solution 1Drop(s) Both EYES three times a day  predniSONE   Tablet  Oral   predniSONE   Tablet 30milliGRAM(s) Oral daily  amLODIPine   Tablet 5milliGRAM(s) Oral daily    MEDICATIONS  (PRN):  acetaminophen   Tablet 650milliGRAM(s) Oral every 6 hours PRN For Temp greater than 38 C (100.4 F)      Vital Signs Last 24 Hrs  T(C): 36.5, Max: 36.9 (06-25 @ 21:02)  HR: 79 (68 - 79)  BP: 147/79 (146/77 - 155/77)  RR: 18 (18 - 18)  SpO2: 94% (92% - 94%)  Wt(kg): --  CAPILLARY BLOOD GLUCOSE    I&O's Summary  I & Os for 24h ending 26 Jun 2017 07:00  =============================================  IN: 860 ml / OUT: 0 ml / NET: 860 ml    I & Os for current day (as of 26 Jun 2017 12:50)  =============================================  IN: 420 ml / OUT: 0 ml / NET: 420 ml      PHYSICAL EXAM  GENERAL: NAD, well-developed  HEAD:  Atraumatic, Normocephalic  EYES: EOMI, PERRLA, conjunctiva and sclera clear  NECK: Supple, No JVD  CHEST/LUNG: CTAB  HEART: Regular rate and rhythm; No murmurs, rubs, or gallops  ABDOMEN: Soft, Nontender, Nondistended; Bowel sounds present  EXTREMITIES:  2+ Peripheral Pulses, No clubbing, cyanosis, or edema  PSYCH: AAOx3  SKIN: No rashes or lesions      LABS:                        15.0   8.0   )-----------( 250      ( 26 Jun 2017 06:49 )             44.7     06-26    139  |  100  |  23  ----------------------------<  87  4.3   |  24  |  0.66    Ca    9.7      26 Jun 2017 06:49  Phos  3.6     06-26  Mg     2.3     06-26            RADIOLOGY & ADDITIONAL TESTS:    Imaging Personally Reviewed:  Consultant(s) Notes Reviewed:    Care Discussed with Consultants/Other Providers: Patient is a 81y old  Female who presents with a chief complaint of post viral PNA, COPD exacerbation         SUBJECTIVE / OVERNIGHT EVENTS:  Patient was examined. Overnight per nursing staff, the patient have a 5.3sec episode of paroxysmal atrial tachycardia.       MEDICATIONS  (STANDING):  enoxaparin Injectable 40milliGRAM(s) SubCutaneous every 24 hours  famotidine    Tablet 20milliGRAM(s) Oral two times a day  ALBUTerol/ipratropium for Nebulization 3milliLiter(s) Nebulizer every 6 hours  artificial  tears Solution 1Drop(s) Both EYES three times a day  predniSONE   Tablet  Oral   predniSONE   Tablet 30milliGRAM(s) Oral daily  amLODIPine   Tablet 5milliGRAM(s) Oral daily    MEDICATIONS  (PRN):  acetaminophen   Tablet 650milliGRAM(s) Oral every 6 hours PRN For Temp greater than 38 C (100.4 F)      Vital Signs Last 24 Hrs  T(C): 36.5, Max: 36.9 (06-25 @ 21:02)  HR: 79 (68 - 79)  BP: 147/79 (146/77 - 155/77)  RR: 18 (18 - 18)  SpO2: 94% (92% - 94%)  Wt(kg): --  CAPILLARY BLOOD GLUCOSE    I&O's Summary  I & Os for 24h ending 26 Jun 2017 07:00  =============================================  IN: 860 ml / OUT: 0 ml / NET: 860 ml    I & Os for current day (as of 26 Jun 2017 12:50)  =============================================  IN: 420 ml / OUT: 0 ml / NET: 420 ml      PHYSICAL EXAM  GENERAL: NAD, well-developed  HEAD:  Atraumatic, Normocephalic  EYES: EOMI, PERRLA, conjunctiva and sclera clear  NECK: Supple, No JVD  CHEST/LUNG: CTAB  HEART: Regular rate and rhythm; No murmurs, rubs, or gallops  ABDOMEN: Soft, Nontender, Nondistended; Bowel sounds present  EXTREMITIES:  2+ Peripheral Pulses, No clubbing, cyanosis, or edema  PSYCH: AAOx3  SKIN: No rashes or lesions      LABS:                        15.0   8.0   )-----------( 250      ( 26 Jun 2017 06:49 )             44.7     06-26    139  |  100  |  23  ----------------------------<  87  4.3   |  24  |  0.66    Ca    9.7      26 Jun 2017 06:49  Phos  3.6     06-26  Mg     2.3     06-26            RADIOLOGY & ADDITIONAL TESTS:    Imaging Personally Reviewed:  Consultant(s) Notes Reviewed:    Care Discussed with Consultants/Other Providers: Patient is a 81y old  Female who presents with a chief complaint of post viral PNA, COPD exacerbation         SUBJECTIVE / OVERNIGHT EVENTS:  Patient was examined. Overnight per nursing staff, the patient have a 5.3sec episode of paroxysmal atrial tachycardia.       MEDICATIONS  (STANDING):  enoxaparin Injectable 40milliGRAM(s) SubCutaneous every 24 hours  famotidine    Tablet 20milliGRAM(s) Oral two times a day  ALBUTerol/ipratropium for Nebulization 3milliLiter(s) Nebulizer every 6 hours  artificial  tears Solution 1Drop(s) Both EYES three times a day  predniSONE   Tablet  Oral   predniSONE   Tablet 30milliGRAM(s) Oral daily  amLODIPine   Tablet 5milliGRAM(s) Oral daily    MEDICATIONS  (PRN):  acetaminophen   Tablet 650milliGRAM(s) Oral every 6 hours PRN For Temp greater than 38 C (100.4 F)      Vital Signs Last 24 Hrs  T(C): 36.5, Max: 36.9 (06-25 @ 21:02)  HR: 79 (68 - 79)  BP: 147/79 (146/77 - 155/77)  RR: 18 (18 - 18)  SpO2: 94% (92% - 94%)  Wt(kg): --  CAPILLARY BLOOD GLUCOSE    I&O's Summary  I & Os for 24h ending 26 Jun 2017 07:00  =============================================  IN: 860 ml / OUT: 0 ml / NET: 860 ml    I & Os for current day (as of 26 Jun 2017 12:50)  =============================================  IN: 420 ml / OUT: 0 ml / NET: 420 ml      PHYSICAL EXAM  GENERAL: NAD, well-developed  HEAD:  Atraumatic, Normocephalic  EYES: EOMI, PERRLA, conjunctiva and sclera clear  NECK: Supple, No JVD  CHEST/LUNG: CTAB  HEART: Regular rate and rhythm; No murmurs, rubs, or gallops  ABDOMEN: Soft, Nontender, Nondistended; Bowel sounds present  EXTREMITIES:  2+ Peripheral Pulses, No clubbing, cyanosis, or edema  PSYCH: AAOx3  SKIN: No rashes or lesions      LABS:                        15.0   8.0   )-----------( 250      ( 26 Jun 2017 06:49 )             44.7     06-26    139  |  100  |  23  ----------------------------<  87  4.3   |  24  |  0.66    Ca    9.7      26 Jun 2017 06:49  Phos  3.6     06-26  Mg     2.3     06-26      RADIOLOGY & ADDITIONAL TESTS:    Imaging Personally Reviewed:  Consultant(s) Notes Reviewed:    Care Discussed with Consultants/Other Providers:

## 2017-06-26 NOTE — PROGRESS NOTE ADULT - PROBLEM SELECTOR PROBLEM 1
HCAP (healthcare-associated pneumonia)
Sepsis, due to unspecified organism

## 2017-06-26 NOTE — PROGRESS NOTE ADULT - PROBLEM SELECTOR PROBLEM 3
SVT (supraventricular tachycardia)
COPD exacerbation
SVT (supraventricular tachycardia)

## 2017-06-26 NOTE — PROGRESS NOTE ADULT - PROBLEM SELECTOR PLAN 1
- afebrile, not septic. clinically improving and wheezing is no longer appreciated on physical exam.    - c/w robitussin PRN - afebrile, not septic. clinically improving and wheezing is no longer appreciated on physical exam.  - c/w robitussin PRN

## 2017-06-26 NOTE — PROGRESS NOTE ADULT - ASSESSMENT
81F w/PMHx HTN, asthma, COPD (on 3LNC @home), recently a/w COPD exac 2/2 influenza, readmitted w/sepsis 2/2 post viral HCAP, hospital course c/b COPD exacerbation.
81 y.o. woman with COPD, asthma, and HTN a/w sepsis 2/2 post viral HCAP now with resolved sepsis and hospital course c/b COPD exacerbation still continues to wheeze.
81 y.o. woman with COPD, asthma, and HTN a/w sepsis 2/2 post viral HCAP now with resolved sepsis and hospital course c/b COPD exacerbation still continues to wheeze.
81 y.o. woman with COPD, asthma, and HTN a/w sepsis 2/2 post viral HCAP.
81F w/PMHx HTN, asthma, COPD (on 3LNC @home), recently a/w COPD exac 2/2 influenza, readmitted w/sepsis 2/2 post viral HCAP, hospital course c/b COPD exacerbation.
81F w/PMHx HTN, asthma, COPD (on 3LNC @home), recently a/w COPD exac 2/2 influenza, readmitted w/sepsis 2/2 post viral HCAP, hospital course c/b COPD exacerbation.

## 2017-06-26 NOTE — PROGRESS NOTE ADULT - PROBLEM SELECTOR PLAN 4
- Home norvasc, valsartan held in setting of sepsis, BP mildly elevated but acceptable. If persistently elevated will consider restarting home meds - Home norvasc, valsartan held in setting of sepsis, BP mildly elevated.  -start Amlodipine 5mg PO once daily

## 2017-06-26 NOTE — PROGRESS NOTE ADULT - PROBLEM SELECTOR PROBLEM 2
COPD exacerbation
HCAP (healthcare-associated pneumonia)

## 2017-06-26 NOTE — PROGRESS NOTE ADULT - PROBLEM SELECTOR PLAN 6
- full code  - anticipate discharge home when wheezing improves and patient is transitioned to oral antibiotics - full code  - Patient ready to be discharged.

## 2017-06-26 NOTE — PROGRESS NOTE ADULT - PROBLEM SELECTOR PLAN 2
- patient wheezing on physical exam is resolved  - c/w prednisone 40mg x 5 days (d 5/5), duonebs q6h - patient no longer wheezing on physical exam   - start prednisone taper, duonebs q6h until patient is discharged - patient no longer wheezing on physical exam   - start prednisone taper

## 2018-05-15 ENCOUNTER — EMERGENCY (EMERGENCY)
Facility: HOSPITAL | Age: 83
LOS: 1 days | Discharge: HOME | End: 2018-05-15
Attending: EMERGENCY MEDICINE | Admitting: EMERGENCY MEDICINE
Payer: MEDICARE

## 2018-05-15 VITALS
OXYGEN SATURATION: 93 % | SYSTOLIC BLOOD PRESSURE: 161 MMHG | WEIGHT: 139.99 LBS | RESPIRATION RATE: 18 BRPM | HEART RATE: 90 BPM | TEMPERATURE: 98 F | DIASTOLIC BLOOD PRESSURE: 78 MMHG

## 2018-05-15 VITALS
RESPIRATION RATE: 17 BRPM | DIASTOLIC BLOOD PRESSURE: 77 MMHG | TEMPERATURE: 98 F | SYSTOLIC BLOOD PRESSURE: 160 MMHG | HEART RATE: 81 BPM | OXYGEN SATURATION: 94 %

## 2018-05-15 DIAGNOSIS — Z90.89 ACQUIRED ABSENCE OF OTHER ORGANS: Chronic | ICD-10-CM

## 2018-05-15 DIAGNOSIS — Z90.710 ACQUIRED ABSENCE OF BOTH CERVIX AND UTERUS: Chronic | ICD-10-CM

## 2018-05-15 LAB
ALBUMIN SERPL ELPH-MCNC: 5 G/DL — SIGNIFICANT CHANGE UP (ref 3.3–5)
ALP SERPL-CCNC: 72 U/L — SIGNIFICANT CHANGE UP (ref 40–120)
ALT FLD-CCNC: 18 U/L — SIGNIFICANT CHANGE UP (ref 10–45)
ANION GAP SERPL CALC-SCNC: 12 MMOL/L — SIGNIFICANT CHANGE UP (ref 5–17)
APPEARANCE UR: ABNORMAL
AST SERPL-CCNC: 25 U/L — SIGNIFICANT CHANGE UP (ref 10–40)
BACTERIA # UR AUTO: ABNORMAL /HPF
BASOPHILS # BLD AUTO: 0 K/UL — SIGNIFICANT CHANGE UP (ref 0–0.2)
BASOPHILS NFR BLD AUTO: 0.9 % — SIGNIFICANT CHANGE UP (ref 0–2)
BILIRUB SERPL-MCNC: 0.5 MG/DL — SIGNIFICANT CHANGE UP (ref 0.2–1.2)
BILIRUB UR-MCNC: NEGATIVE — SIGNIFICANT CHANGE UP
BUN SERPL-MCNC: 10 MG/DL — SIGNIFICANT CHANGE UP (ref 7–23)
CALCIUM SERPL-MCNC: 10 MG/DL — SIGNIFICANT CHANGE UP (ref 8.4–10.5)
CHLORIDE SERPL-SCNC: 97 MMOL/L — SIGNIFICANT CHANGE UP (ref 96–108)
CO2 SERPL-SCNC: 31 MMOL/L — SIGNIFICANT CHANGE UP (ref 22–31)
COLOR SPEC: SIGNIFICANT CHANGE UP
CREAT SERPL-MCNC: 0.63 MG/DL — SIGNIFICANT CHANGE UP (ref 0.5–1.3)
DIFF PNL FLD: NEGATIVE — SIGNIFICANT CHANGE UP
EOSINOPHIL # BLD AUTO: 0.1 K/UL — SIGNIFICANT CHANGE UP (ref 0–0.5)
EOSINOPHIL NFR BLD AUTO: 1.2 % — SIGNIFICANT CHANGE UP (ref 0–6)
EPI CELLS # UR: SIGNIFICANT CHANGE UP /HPF
GLUCOSE SERPL-MCNC: 91 MG/DL — SIGNIFICANT CHANGE UP (ref 70–99)
GLUCOSE UR QL: NEGATIVE — SIGNIFICANT CHANGE UP
HCT VFR BLD CALC: 46.1 % — HIGH (ref 34.5–45)
HGB BLD-MCNC: 15.1 G/DL — SIGNIFICANT CHANGE UP (ref 11.5–15.5)
KETONES UR-MCNC: NEGATIVE — SIGNIFICANT CHANGE UP
LEUKOCYTE ESTERASE UR-ACNC: ABNORMAL
LYMPHOCYTES # BLD AUTO: 2.2 K/UL — SIGNIFICANT CHANGE UP (ref 1–3.3)
LYMPHOCYTES # BLD AUTO: 37.8 % — SIGNIFICANT CHANGE UP (ref 13–44)
MCHC RBC-ENTMCNC: 31.6 PG — SIGNIFICANT CHANGE UP (ref 27–34)
MCHC RBC-ENTMCNC: 32.8 GM/DL — SIGNIFICANT CHANGE UP (ref 32–36)
MCV RBC AUTO: 96.6 FL — SIGNIFICANT CHANGE UP (ref 80–100)
MONOCYTES # BLD AUTO: 0.5 K/UL — SIGNIFICANT CHANGE UP (ref 0–0.9)
MONOCYTES NFR BLD AUTO: 8.6 % — SIGNIFICANT CHANGE UP (ref 2–14)
NEUTROPHILS # BLD AUTO: 3 K/UL — SIGNIFICANT CHANGE UP (ref 1.8–7.4)
NEUTROPHILS NFR BLD AUTO: 51.6 % — SIGNIFICANT CHANGE UP (ref 43–77)
NITRITE UR-MCNC: NEGATIVE — SIGNIFICANT CHANGE UP
PH UR: 8 — SIGNIFICANT CHANGE UP (ref 5–8)
PLATELET # BLD AUTO: 201 K/UL — SIGNIFICANT CHANGE UP (ref 150–400)
POTASSIUM SERPL-MCNC: 4.1 MMOL/L — SIGNIFICANT CHANGE UP (ref 3.5–5.3)
POTASSIUM SERPL-SCNC: 4.1 MMOL/L — SIGNIFICANT CHANGE UP (ref 3.5–5.3)
PROT SERPL-MCNC: 8.4 G/DL — HIGH (ref 6–8.3)
PROT UR-MCNC: SIGNIFICANT CHANGE UP
RBC # BLD: 4.78 M/UL — SIGNIFICANT CHANGE UP (ref 3.8–5.2)
RBC # FLD: 11.8 % — SIGNIFICANT CHANGE UP (ref 10.3–14.5)
RBC CASTS # UR COMP ASSIST: SIGNIFICANT CHANGE UP /HPF (ref 0–2)
SODIUM SERPL-SCNC: 140 MMOL/L — SIGNIFICANT CHANGE UP (ref 135–145)
SP GR SPEC: 1.01 — SIGNIFICANT CHANGE UP (ref 1.01–1.02)
UROBILINOGEN FLD QL: NEGATIVE — SIGNIFICANT CHANGE UP
WBC # BLD: 5.7 K/UL — SIGNIFICANT CHANGE UP (ref 3.8–10.5)
WBC # FLD AUTO: 5.7 K/UL — SIGNIFICANT CHANGE UP (ref 3.8–10.5)
WBC UR QL: >50 /HPF (ref 0–5)

## 2018-05-15 PROCEDURE — 85027 COMPLETE CBC AUTOMATED: CPT

## 2018-05-15 PROCEDURE — 99284 EMERGENCY DEPT VISIT MOD MDM: CPT

## 2018-05-15 PROCEDURE — 99284 EMERGENCY DEPT VISIT MOD MDM: CPT | Mod: 25

## 2018-05-15 PROCEDURE — 87086 URINE CULTURE/COLONY COUNT: CPT

## 2018-05-15 PROCEDURE — 81001 URINALYSIS AUTO W/SCOPE: CPT

## 2018-05-15 PROCEDURE — 94640 AIRWAY INHALATION TREATMENT: CPT

## 2018-05-15 PROCEDURE — 87186 SC STD MICRODIL/AGAR DIL: CPT

## 2018-05-15 PROCEDURE — 96374 THER/PROPH/DIAG INJ IV PUSH: CPT

## 2018-05-15 PROCEDURE — 80053 COMPREHEN METABOLIC PANEL: CPT

## 2018-05-15 RX ORDER — IPRATROPIUM/ALBUTEROL SULFATE 18-103MCG
3 AEROSOL WITH ADAPTER (GRAM) INHALATION ONCE
Qty: 0 | Refills: 0 | Status: COMPLETED | OUTPATIENT
Start: 2018-05-15 | End: 2018-05-15

## 2018-05-15 RX ORDER — CEFTRIAXONE 500 MG/1
1 INJECTION, POWDER, FOR SOLUTION INTRAMUSCULAR; INTRAVENOUS ONCE
Qty: 0 | Refills: 0 | Status: COMPLETED | OUTPATIENT
Start: 2018-05-15 | End: 2018-05-15

## 2018-05-15 RX ORDER — POLYETHYLENE GLYCOL 3350 17 G/17G
17 POWDER, FOR SOLUTION ORAL
Qty: 1 | Refills: 0 | OUTPATIENT
Start: 2018-05-15 | End: 2018-06-13

## 2018-05-15 RX ORDER — CEFTRIAXONE 500 MG/1
1 INJECTION, POWDER, FOR SOLUTION INTRAMUSCULAR; INTRAVENOUS ONCE
Qty: 0 | Refills: 0 | Status: DISCONTINUED | OUTPATIENT
Start: 2018-05-15 | End: 2018-05-15

## 2018-05-15 RX ADMIN — CEFTRIAXONE 100 GRAM(S): 500 INJECTION, POWDER, FOR SOLUTION INTRAMUSCULAR; INTRAVENOUS at 13:24

## 2018-05-15 RX ADMIN — Medication 3 MILLILITER(S): at 13:24

## 2018-05-15 NOTE — CHART NOTE - NSCHARTNOTEFT_GEN_A_CORE
EMERGENCY SOCIAL WORK: Pt is 82 year old, , [place] citizen/English speaking, , female presents with c/o vaginal pain/ abdominal pain. Per ED chart pt with pmhx: HTN, UTI, hysterectomy, tosillectomy. Pt cleared for d/c from ED recommended follow up with GYN. LMSW consulted by JESSENIA Mendoza for d/c assistance. LMSW met with pt at bedside introduced role. Pt confirmed demographics. Pt reports she ambulates independently. Pt reports son at work right now. Pt reports she has no financial means to get home. LMSW reviewed insurance pt has [employer] Medicare and Medicaid benefits. LMSW contacted [employer] [phone] phone  scheduled trip case# [id number] 3 pm  with [employer] [phone] phone. SW made pt and JESSENIA Mendoza aware. Pt to wait in ED waiting are for transport. Social work to remain available.

## 2018-05-15 NOTE — ED PROVIDER NOTE - PMH
Asthma    Chronic obstructive pulmonary disease, unspecified COPD type    HTN (hypertension)    UTI (urinary tract infection)  Reoccuring

## 2018-05-15 NOTE — ED PROVIDER NOTE - PROGRESS NOTE DETAILS
Yordy Renae MD: Patient with uti will give iv antibiotics. Stable, encouraged to  follow up with GYN and to use her Pessary that she has not been using. Will prescribe antibiotics for uti as well for outpatient.  Patient educated on hypoxia and she states she has had this before and used to smoke, patient without shortness of breath or chest pain or confusion or dyspnea on exertion. Patient encouraged to  follow up with primary medical doctor this week for evaluation for home oxygen prn.   will give duo neb   No immediate life threatening issues present on history or clinical exam. Patient is a safe disposition home, has capacity and insight into their condition, is ambulatory in the Emergency Department and will follow up with their doctor(s) this week. Patient  understands anticipatory guidance and was given strict return and follow up precautions. The patient has been informed of all concerning signs and symptoms to return to Emergency Department, the necessity to follow up with the PMD/Clinic/follow up provided within 2-3 days was explained, and the patient reports understanding of above with capacity and insight.

## 2018-05-15 NOTE — ED PROVIDER NOTE - PHYSICAL EXAMINATION
GEN: NAD, awake, eyes open spontaneously/    /HEENT: NCAT, MMM, Trachea midline, normal conjunctiva, perrl/    /CHEST/LUNGS: Non-tachypneic, CTAB, bilateral breath sounds/    /CARDIAC: Non-tachycardic, normal perfusion/    /ABDOMEN: Soft, NTND, No rebound/guarding/    /MSK: No edema, no gross deformity of extremities/    /SKIN: No rashes, no petechiae, no vesicles/    /NEURO: CN grossly intact, normal coordination, no focal motor or sensory deficits/    /PSYCH: Alert, appropriate, cooperative, with capacity and insight/    / No vaginal lesion noted. vaginal prolapse.

## 2018-05-15 NOTE — ED ADULT NURSE NOTE - DISCHARGE TEACHING
Pt instructed to followup with PMD in 24-48 hours; instructed to followup with OBGYN in 2-3 days, phone # provided; instructed on prescribed ABX use; verbalized understanding.

## 2018-05-15 NOTE — ED PROVIDER NOTE - MEDICAL DECISION MAKING DETAILS
82 year old female with PMHx of HTN, reoccurring UTI, with uterine prolapse mild dysuria. Pt has been taking AB for past month for UTI. Will get cbc cmp and urinalysis to evaluate for ongoing UTI and culture. Patients ABD exam is not concerning for surgical pathology. Pt pain is under control. FU with gynecologist for bladder sling or peseri replacement as shes had in the past 82 year old female with PMHx of HTN, reoccurring UTI, with uterine prolapse mild dysuria. Pt has been taking AB for past month for UTI. Will get cbc cmp and urinalysis to evaluate for ongoing UTI and culture. Patients ABD exam is not concerning for surgical pathology. Pt pain is under control. FU with gynecologist for bladder sling or Pessary replacement as shes had in the past

## 2018-05-15 NOTE — ED PROVIDER NOTE - OBJECTIVE STATEMENT
82 year old female with PMHx of HTN, reoccuring UTI and PSHx of hysterectomy, and tosillectomy presents to the ED with complaints of vaginal pain (ABD pain and UTI.) Pt reports that after her hysterectomy she began feeling a lump in her vagina. Complains that she is able to feel the lump everytime she pees. Patient has sensation to urinate but has difficulty. Describes the lump as a ball full of air, and notes that it feels as if it wants to come out when she pees. Has had intermittent pain for sometime, however over the past week her symptoms have gotten worse. Sometimes has pain with bowel movements. No bulging with bowel movements. Denies vaginal discharge Pt has fever and chills. Has had F/C for awhile but pt cannot remember when her symptoms  began. Has been taking AB and Tylenol for her UTI for a month, but continues to have problems. C/o nausea. No vomitting. Has not experienced this pain in the past. No known drug allergies 82 year old female with PMHx of HTN, reoccuring UTI and PSHx of hysterectomy, and tosillectomy presents to the ED with complaints of vaginal pain (ABD pain and UTI.) Pt reports that after her hysterectomy she began feeling a lump in her vagina. Complains that she is able to feel the lump everytime she pees. Patient has sensation to urinate but has difficulty. Describes the lump as a ball full of air, and notes that it feels as if it wants to come out when she pees. Has had intermittent pain for sometime, however over the past week her symptoms have gotten worse. Sometimes has pain with bowel movements. No bulging with bowel movements. Denies vaginal discharge Pt has fever and chills. Has had F/C for awhile but pt cannot remember when her symptoms  began. Has been taking AB and Tylenol for her UTI for a month, but continues to have problems. C/o nausea. No vomiting. Has not experienced this pain in the past. No known drug allergies    Use of [employer] translation services: [place]   Jaylon  #[id number]

## 2018-05-15 NOTE — ED PROVIDER NOTE - NS ED ROS FT
Positive for vaginal pain, nausea, fever and chills  no change in vision, no throat pain, no chest pain, no abdominal pain, no joint pain, no rashes, no focal neurologic complaints,  all ROS otherwise as per HPI or negative.

## 2018-05-15 NOTE — DOWNTIME INTERRUPTION NOTE - WHICH MANUAL FORMS INITIATED?
see paper charting for nursing documentation during downtime - 0945 - 1300.  after 1300 nursing documentation on sunrise.

## 2018-05-15 NOTE — ED PROVIDER NOTE - PLAN OF CARE
Follow up with our OB/GYN clinic in 2-3 days if you cannot arrange an appointment with your OB/GYN or cannot get a referral by your primary medical doctor, call [phone] or [phone] and arrange your appointment.   Take your antibiotics until they are fully completed.  Follow up with your medical doctor in 2-3 days or call our clinic at [phone] and state you were seen in the Emergency Department and would like to be seen in clinic.   Take Tylenol 1g every six hours and supplement with ibuprofen 600mg, with food, every six hours which can be taken three hours apart from the Tylenol to have a layered effect.  Drink at least 2 Liters or 64 Ounces of water each day.  SEE YOUR DOCTOR ABOUT HOME OXYGEN as you may become symptomatic or need it in the near future.  Return for any persistent, worsening symptoms, or ANY concerns at all.

## 2018-05-15 NOTE — ED PROVIDER NOTE - CARE PLAN
Principal Discharge DX:	Urinary tract infection  Assessment and plan of treatment:	Follow up with our OB/GYN clinic in 2-3 days if you cannot arrange an appointment with your OB/GYN or cannot get a referral by your primary medical doctor, call [phone] or [phone] and arrange your appointment.   Take your antibiotics until they are fully completed.  Follow up with your medical doctor in 2-3 days or call our clinic at [phone] and state you were seen in the Emergency Department and would like to be seen in clinic.   Take Tylenol 1g every six hours and supplement with ibuprofen 600mg, with food, every six hours which can be taken three hours apart from the Tylenol to have a layered effect.  Drink at least 2 Liters or 64 Ounces of water each day.  SEE YOUR DOCTOR ABOUT HOME OXYGEN as you may become symptomatic or need it in the near future.  Return for any persistent, worsening symptoms, or ANY concerns at all.  Secondary Diagnosis:	Vaginal prolapse

## 2018-05-16 PROBLEM — Z00.00 ENCOUNTER FOR PREVENTIVE HEALTH EXAMINATION: Status: ACTIVE | Noted: 2018-05-16

## 2018-05-17 NOTE — ED POST DISCHARGE NOTE - RESULT SUMMARY
ucx >100k nadira abraham on augmentin which is resistent ucx >100k nadira abraham on augmentin which is resistant. Need to switch abx

## 2018-05-17 NOTE — ED POST DISCHARGE NOTE - DETAILS
Used [facility]  to leave  -Yazmin Aguilar PA-C 5/18/18: Patient called back to ED, abdominal pain/overall urinary symptoms improved, no back pain, having occasional subjective fever, currently has follow-up appointment with her doctor on June 4th, discussed to patient that Augmentin is resistant and the only oral alternative is Macrobid at this time despite side effects for the elderly in the long term, patient without allergy contraindications for this medication, will prescribe Macrobid for 5 days at this time. - Gus Bowens MD

## 2018-05-18 RX ORDER — NITROFURANTOIN MACROCRYSTAL 50 MG
1 CAPSULE ORAL
Qty: 10 | Refills: 0 | OUTPATIENT
Start: 2018-05-18 | End: 2018-05-22

## 2018-06-04 ENCOUNTER — APPOINTMENT (OUTPATIENT)
Dept: OBGYN | Facility: CLINIC | Age: 83
End: 2018-06-04
Payer: MEDICARE

## 2018-06-04 ENCOUNTER — OUTPATIENT (OUTPATIENT)
Dept: OUTPATIENT SERVICES | Facility: HOSPITAL | Age: 83
LOS: 1 days | End: 2018-06-04
Payer: MEDICARE

## 2018-06-04 VITALS
WEIGHT: 147 LBS | BODY MASS INDEX: 27.75 KG/M2 | SYSTOLIC BLOOD PRESSURE: 158 MMHG | DIASTOLIC BLOOD PRESSURE: 99 MMHG | HEIGHT: 61 IN

## 2018-06-04 DIAGNOSIS — N81.2 INCOMPLETE UTEROVAGINAL PROLAPSE: ICD-10-CM

## 2018-06-04 DIAGNOSIS — N76.0 ACUTE VAGINITIS: ICD-10-CM

## 2018-06-04 DIAGNOSIS — Z90.89 ACQUIRED ABSENCE OF OTHER ORGANS: Chronic | ICD-10-CM

## 2018-06-04 DIAGNOSIS — Z46.89 ENCOUNTER FOR FITTING AND ADJUSTMENT OF OTHER SPECIFIED DEVICES: ICD-10-CM

## 2018-06-04 DIAGNOSIS — Z87.440 PERSONAL HISTORY OF URINARY (TRACT) INFECTIONS: ICD-10-CM

## 2018-06-04 DIAGNOSIS — Z90.710 ACQUIRED ABSENCE OF BOTH CERVIX AND UTERUS: Chronic | ICD-10-CM

## 2018-06-04 PROCEDURE — 99203 OFFICE O/P NEW LOW 30 MIN: CPT | Mod: NC

## 2018-06-04 PROCEDURE — G0463: CPT

## 2018-06-04 RX ORDER — ACETAMINOPHEN 325 MG/1
325 TABLET, FILM COATED ORAL
Qty: 1 | Refills: 0 | Status: ACTIVE | COMMUNITY
Start: 2018-06-04 | End: 1900-01-01

## 2018-06-07 DIAGNOSIS — Z46.89 ENCOUNTER FOR FITTING AND ADJUSTMENT OF OTHER SPECIFIED DEVICES: ICD-10-CM

## 2018-06-07 DIAGNOSIS — N81.4 UTEROVAGINAL PROLAPSE, UNSPECIFIED: ICD-10-CM

## 2018-06-07 DIAGNOSIS — N39.0 URINARY TRACT INFECTION, SITE NOT SPECIFIED: ICD-10-CM

## 2018-07-06 ENCOUNTER — EMERGENCY (EMERGENCY)
Facility: HOSPITAL | Age: 83
LOS: 1 days | Discharge: HOME | End: 2018-07-06
Attending: EMERGENCY MEDICINE
Payer: MEDICARE

## 2018-07-06 ENCOUNTER — APPOINTMENT (OUTPATIENT)
Dept: UROGYNECOLOGY | Facility: CLINIC | Age: 83
End: 2018-07-06

## 2018-07-06 VITALS
HEART RATE: 81 BPM | DIASTOLIC BLOOD PRESSURE: 84 MMHG | SYSTOLIC BLOOD PRESSURE: 158 MMHG | RESPIRATION RATE: 22 BRPM | OXYGEN SATURATION: 98 %

## 2018-07-06 VITALS
HEART RATE: 84 BPM | SYSTOLIC BLOOD PRESSURE: 163 MMHG | OXYGEN SATURATION: 99 % | DIASTOLIC BLOOD PRESSURE: 98 MMHG | RESPIRATION RATE: 20 BRPM | TEMPERATURE: 98 F | HEIGHT: 63 IN | WEIGHT: 139.99 LBS

## 2018-07-06 VITALS — DIASTOLIC BLOOD PRESSURE: 94 MMHG | SYSTOLIC BLOOD PRESSURE: 192 MMHG

## 2018-07-06 DIAGNOSIS — Z90.89 ACQUIRED ABSENCE OF OTHER ORGANS: Chronic | ICD-10-CM

## 2018-07-06 DIAGNOSIS — Z90.710 ACQUIRED ABSENCE OF BOTH CERVIX AND UTERUS: Chronic | ICD-10-CM

## 2018-07-06 PROCEDURE — 71045 X-RAY EXAM CHEST 1 VIEW: CPT

## 2018-07-06 PROCEDURE — 93005 ELECTROCARDIOGRAM TRACING: CPT

## 2018-07-06 PROCEDURE — 94640 AIRWAY INHALATION TREATMENT: CPT

## 2018-07-06 PROCEDURE — 71045 X-RAY EXAM CHEST 1 VIEW: CPT | Mod: 26

## 2018-07-06 PROCEDURE — 99283 EMERGENCY DEPT VISIT LOW MDM: CPT | Mod: 25

## 2018-07-06 PROCEDURE — 93010 ELECTROCARDIOGRAM REPORT: CPT

## 2018-07-06 PROCEDURE — 99285 EMERGENCY DEPT VISIT HI MDM: CPT | Mod: 25

## 2018-07-06 RX ORDER — IPRATROPIUM/ALBUTEROL SULFATE 18-103MCG
3 AEROSOL WITH ADAPTER (GRAM) INHALATION ONCE
Qty: 0 | Refills: 0 | Status: COMPLETED | OUTPATIENT
Start: 2018-07-06 | End: 2018-07-06

## 2018-07-06 RX ADMIN — Medication 3 MILLILITER(S): at 12:12

## 2018-07-06 NOTE — ED PROVIDER NOTE - CHIEF COMPLAINT
The patient is a 82y Female complaining of The patient is a 82y Female complaining of elevated blood pressure.

## 2018-07-06 NOTE — ED PROVIDER NOTE - ATTENDING CONTRIBUTION TO CARE
83 y/o [place]-speaking (language line used) female with the above documented history and HPI who on exam appears well and comfortable. VSs noted, neck supple, lungs CTA, cardiac sounds s/ audible m/r/g, abdomen soft, NT/ND, extremities s/ asymmetry, skin s/ rash and neurologically intact. There is nothing clinically evident to suggest any acute or emergent process, related to her BP or SpO2. She is asymptomatic. There is no clinical indication for any emergent diagnostic investigation or intervention. She has walked back and forth to the restroom without COLON. The patient is suitable for discharge with appropriate instruction and follow-up.

## 2018-07-06 NOTE — ED PROVIDER NOTE - CARE PLAN
Principal Discharge DX:	Hypertension, unspecified type  Assessment and plan of treatment:	Follow up with your PMD within 48-72 hours.  Continue medication as prescribed .  Any nausea, vomiting,  pain, dizziness, changes in vision, shortness of breath return to ER

## 2018-07-06 NOTE — ED PROVIDER NOTE - PLAN OF CARE
Follow up with your PMD within 48-72 hours.  Continue medication as prescribed .  Any nausea, vomiting,  pain, dizziness, changes in vision, shortness of breath return to ER

## 2018-07-06 NOTE — ED ADULT NURSE NOTE - CHPI ED SYMPTOMS NEG
no chills/no fever/no body aches/no chest pain/no cough/no edema/no diaphoresis/no headache/no hemoptysis

## 2018-07-06 NOTE — ED PROVIDER NOTE - OBJECTIVE STATEMENT
- [id number]  81 yo F with pmhx htn, asthma, and COPD BIBA from urology office for "high blood pressure and low O2 sat". As per EMS, patient found to be hypertensive with a low O2 sat at the office. BP was 190/100s and O2 sat of 90. As per patient, her pcp recently changed her amlodipine to olmesartan and triamterene HCTZ. Patient states she took all her medication this morning. Patient wants the ED to switch her medication back to amlodipine. Patient denies sob and cough. Patient states last time she was in the hospital she was told she needs to use O2. Patient feels like she doesn't need O2. Patient denies ha, blurred vision, cp, sob, cough, abd pain, nvd, dysuria, hematuria, back pain, neck pain, tingling, numbness, weakness, and dyspnea

## 2019-04-20 NOTE — PROGRESS NOTE ADULT - SUBJECTIVE AND OBJECTIVE BOX
done   Pt seen and examined at bedside. No major events o/n. SUBJECTIVE / OVERNIGHT EVENTS:  Pt seen and examined at bedside. No major events o/n.      MEDICATIONS  (STANDING):  enoxaparin Injectable 40milliGRAM(s) SubCutaneous every 24 hours  famotidine    Tablet 20milliGRAM(s) Oral two times a day  predniSONE   Tablet 40milliGRAM(s) Oral daily  ALBUTerol/ipratropium for Nebulization 3milliLiter(s) Nebulizer every 6 hours  artificial  tears Solution 1Drop(s) Both EYES three times a day  vancomycin  IVPB 1250milliGRAM(s) IV Intermittent every 12 hours    MEDICATIONS  (PRN):  acetaminophen   Tablet 650milliGRAM(s) Oral every 6 hours PRN For Temp greater than 38 C (100.4 F)      Vital Signs Last 24 Hrs  T(C): 36.8, Max: 36.8 (06-24 @ 05:07)  HR: 84 (76 - 85)  BP: 159/96 (138/80 - 159/96)  RR: 18 (18 - 18)  SpO2: 92% (92% - 97%)  Wt(kg): --  CAPILLARY BLOOD GLUCOSE    I&O's Summary    I & Os for current day (as of 24 Jun 2017 07:25)  =============================================  IN: 750 ml / OUT: 0 ml / NET: 750 ml      PHYSICAL EXAM  GENERAL: NAD, well-developed  HEAD:  Atraumatic, Normocephalic  EYES: EOMI, PERRLA, conjunctiva and sclera clear  NECK: Supple, No JVD  CHEST/LUNG: Clear to auscultation bilaterally; No wheeze  HEART: Regular rate and rhythm; No murmurs, rubs, or gallops  ABDOMEN: Soft, Nontender, Nondistended; Bowel sounds present  EXTREMITIES:  2+ Peripheral Pulses, No clubbing, cyanosis, or edema  PSYCH: AAOx3  SKIN: No rashes or lesions    LABS: 6-24 labs pending                        13.7   8.6   )-----------( 235      ( 23 Jun 2017 07:05 )             41.4     06-23    144  |  103  |  9   ----------------------------<  88  4.2   |  28  |  0.65    Ca    8.9      23 Jun 2017 07:05  Phos  2.5     06-23  Mg     2.1     06-23      RADIOLOGY & ADDITIONAL TESTS:    Imaging Personally Reviewed:  Consultant(s) Notes Reviewed:    Care Discussed with Consultants/Other Providers: SUBJECTIVE / OVERNIGHT EVENTS:  Pt seen and examined at bedside. No major events o/n. NSR@60 on tele, few PACs, 1.8s PAT yesterday evening.      MEDICATIONS  (STANDING):  enoxaparin Injectable 40milliGRAM(s) SubCutaneous every 24 hours  famotidine    Tablet 20milliGRAM(s) Oral two times a day  predniSONE   Tablet 40milliGRAM(s) Oral daily  ALBUTerol/ipratropium for Nebulization 3milliLiter(s) Nebulizer every 6 hours  artificial  tears Solution 1Drop(s) Both EYES three times a day  vancomycin  IVPB 1250milliGRAM(s) IV Intermittent every 12 hours    MEDICATIONS  (PRN):  acetaminophen   Tablet 650milliGRAM(s) Oral every 6 hours PRN For Temp greater than 38 C (100.4 F)      Vital Signs Last 24 Hrs  T(C): 36.8, Max: 36.8 (06-24 @ 05:07)  HR: 84 (76 - 85)  BP: 159/96 (138/80 - 159/96)  RR: 18 (18 - 18)  SpO2: 92% (92% - 97%)  Wt(kg): --  CAPILLARY BLOOD GLUCOSE    I&O's Summary    I & Os for current day (as of 24 Jun 2017 07:25)  =============================================  IN: 750 ml / OUT: 0 ml / NET: 750 ml      PHYSICAL EXAM  GENERAL: NAD, well-developed  HEAD:  Atraumatic, Normocephalic  EYES: EOMI, PERRLA, conjunctiva and sclera clear  NECK: Supple, No JVD  CHEST/LUNG: Clear to auscultation bilaterally; No wheeze  HEART: Regular rate and rhythm; No murmurs, rubs, or gallops  ABDOMEN: Soft, Nontender, Nondistended; Bowel sounds present  EXTREMITIES:  2+ Peripheral Pulses, No clubbing, cyanosis, or edema  PSYCH: AAOx3  SKIN: No rashes or lesions    LABS: 6-24 labs pending                        13.7   8.6   )-----------( 235      ( 23 Jun 2017 07:05 )             41.4     06-23    144  |  103  |  9   ----------------------------<  88  4.2   |  28  |  0.65    Ca    8.9      23 Jun 2017 07:05  Phos  2.5     06-23  Mg     2.1     06-23      RADIOLOGY & ADDITIONAL TESTS:    Imaging Personally Reviewed:  Consultant(s) Notes Reviewed:    Care Discussed with Consultants/Other Providers: SUBJECTIVE / OVERNIGHT EVENTS:  Pt seen and examined at bedside. No major events o/n. NSR@60 on tele, few PACs, 1.8s PAT yesterday evening. Pt still w/cough, but endorses feeling overall improved.      MEDICATIONS  (STANDING):  enoxaparin Injectable 40milliGRAM(s) SubCutaneous every 24 hours  famotidine    Tablet 20milliGRAM(s) Oral two times a day  predniSONE   Tablet 40milliGRAM(s) Oral daily  ALBUTerol/ipratropium for Nebulization 3milliLiter(s) Nebulizer every 6 hours  artificial  tears Solution 1Drop(s) Both EYES three times a day  vancomycin  IVPB 1250milliGRAM(s) IV Intermittent every 12 hours    MEDICATIONS  (PRN):  acetaminophen   Tablet 650milliGRAM(s) Oral every 6 hours PRN For Temp greater than 38 C (100.4 F)      Vital Signs Last 24 Hrs  T(C): 36.8, Max: 36.8 (06-24 @ 05:07)  HR: 84 (76 - 85)  BP: 159/96 (138/80 - 159/96)  RR: 18 (18 - 18)  SpO2: 92% (92% - 97%)  Wt(kg): --  CAPILLARY BLOOD GLUCOSE    I&O's Summary    I & Os for current day (as of 24 Jun 2017 07:25)  =============================================  IN: 750 ml / OUT: 0 ml / NET: 750 ml      PHYSICAL EXAM  GENERAL: NAD, well-developed  HEAD:  Atraumatic, Normocephalic  EYES: EOMI, PERRLA, conjunctiva and sclera clear  NECK: Supple, No JVD  CHEST/LUNG: Diffuse expiratory wheeze  HEART: Regular rate and rhythm; No murmurs, rubs, or gallops  ABDOMEN: Soft, Nontender, Nondistended; Bowel sounds present  EXTREMITIES:  2+ Peripheral Pulses, No clubbing, cyanosis, or edema  PSYCH: AAOx3  SKIN: No rashes or lesions    LABS: 6-24 labs pending                        13.7   8.6   )-----------( 235      ( 23 Jun 2017 07:05 )             41.4     06-23    144  |  103  |  9   ----------------------------<  88  4.2   |  28  |  0.65    Ca    8.9      23 Jun 2017 07:05  Phos  2.5     06-23  Mg     2.1     06-23      RADIOLOGY & ADDITIONAL TESTS:    Imaging Personally Reviewed:  Consultant(s) Notes Reviewed:    Care Discussed with Consultants/Other Providers:

## 2019-07-09 NOTE — DISCHARGE NOTE ADULT - NS MD DC FALL RISK RISK
For information on Fall & Injury Prevention, visit [email or web address] POST-OP DIAGNOSIS:  Adnexal mass 09-Jul-2019 12:08:57  Gabriele Krause

## 2019-09-13 NOTE — ED ADULT NURSE NOTE - CAS DISCH TRANSFER METHOD
Quality 110: Preventive Care And Screening: Influenza Immunization: Influenza Immunization Administered during Influenza season Detail Level: Detailed Quality 111:Pneumonia Vaccination Status For Older Adults: Pneumococcal Vaccination Previously Received Quality 131: Pain Assessment And Follow-Up: Pain assessment documented as positive using a standardized tool AND a follow-up plan is documented coordinated y  sandy/Transportation service Transportation service/coordinated by  sandy

## 2019-10-31 NOTE — ED ADULT TRIAGE NOTE - BP NONINVASIVE DIASTOLIC (MM HG)
80 Spiral Flap Text: The defect edges were debeveled with a #15 scalpel blade.  Given the location of the defect, shape of the defect and the proximity to free margins a spiral flap was deemed most appropriate.  Using a sterile surgical marker, an appropriate rotation flap was drawn incorporating the defect and placing the expected incisions within the relaxed skin tension lines where possible. The area thus outlined was incised deep to adipose tissue with a #15 scalpel blade.  The skin margins were undermined to an appropriate distance in all directions utilizing iris scissors.

## 2020-04-28 NOTE — ED PROVIDER NOTE - CROS ED GI ALL NEG
----- Message from Juan Aragon sent at 4/28/2020  2:23 PM EDT -----  Contact: pt- [phone]  She needs a refill on [facility] ditropan [facility] [place] at [phone] appt- 4-17-20-lr - - -

## 2020-06-10 NOTE — H&P ADULT. - MS EXT PE MLT D E PC
normal/no clubbing/no pedal edema/no cyanosis Z Plasty Text: The lesion was extirpated to the level of the fat with a #15 scalpel blade.  Given the location of the defect, shape of the defect and the proximity to free margins a Z-plasty was deemed most appropriate for repair.  Using a sterile surgical marker, the appropriate transposition arms of the Z-plasty were drawn incorporating the defect and placing the expected incisions within the relaxed skin tension lines where possible.    The area thus outlined was incised deep to adipose tissue with a #15 scalpel blade.  The skin margins were undermined to an appropriate distance in all directions utilizing iris scissors.  The opposing transposition arms were then transposed into place in opposite direction and anchored with interrupted buried subcutaneous sutures.

## 2020-06-22 NOTE — PROGRESS NOTE ADULT - PROBLEM SELECTOR PLAN 2
- patient wheezing on physical exam but improved overall  - c/w prednisone 40mg x 5 days (d 4/5), duonebs q6h - patient wheezing on physical exam but improved overall  - c/w prednisone 40mg x 5 days (d 5/5), duonebs q6h Statement Selected

## 2020-09-02 ENCOUNTER — RESULT REVIEW (OUTPATIENT)
Age: 85
End: 2020-09-02

## 2020-10-14 NOTE — ED ADULT NURSE NOTE - PSH
From: Mohini Shaw  To: Patrick Trinidad  Sent: 10/14/2020 12:39 PM CDT  Subject: Medication Question    Hello    I only had a few hydrocodone left could you please prescribe some for the pain?    Thank you!  
Pt has 9mm left ureteral kidney stone and scheduled for surgery 10/22/2020.  Will have MD review and advise on refill request of Norco.    
Renewal Rx for [place] faxed to pharmacy of choice  
Silvia White, ALYCIA   Registered Nurse   Specialty:  Registered Nurse   Telephone Encounter   Signed   Creation Time:  10/14/2020  8:43 AM               Signed           Ovidio please discuss with Dr. Trinidad for refill.  Patient to be encouraged to social distance and utilize mask if needing to travel during pandemic prior to surgery next week.                         Tag Copy   Lindsey DíazJaleel   Department Support Staff      Telephone Encounter   Signed   Creation Time:  10/14/2020  8:36 AM               Signed           Patient is going out of town this weekend and she would like a refill on her hydrocodone prior her trip.                      
H/O: hysterectomy    History of tonsillectomy

## 2020-12-16 PROBLEM — Z87.440 HISTORY OF URINARY TRACT INFECTION: Status: RESOLVED | Noted: 2018-06-04 | Resolved: 2020-12-16

## 2022-07-19 PROBLEM — N81.2 CERVICAL PROLAPSE: Status: ACTIVE | Noted: 2018-06-04

## 2022-08-30 NOTE — PATIENT PROFILE ADULT. - FUNCTIONAL SCREEN CURRENT LEVEL: EATING, MLM
2/2 hidradenitis suppurativa of the pelvis and mons pubis  ID following, okay to discharge, while inpatient continue IV vancomycin and Zosyn, switch to PO doxy on discharge, patient has script already  Wound surgery following, no surgery while inpatient, continue medical management, outpatient follow up after discharge, directions for wound care discussed with patient   (0) independent

## 2023-02-10 NOTE — ED ADULT NURSE NOTE - PERSON CONFIRMING BED ASSIGNMENT
Discharge Instructions  Chest Pain    You have been seen today for chest pain or discomfort.  At this time, your provider has found no signs that your chest pain is due to a serious or life-threatening condition, (or you have declined more testing and/or admission to the hospital). However, sometimes there is a serious problem that does not show up right away. Your evaluation today may not be complete and you may need further testing and evaluation.     Generally, every Emergency Department visit should have a follow-up clinic visit with either a primary or a specialty clinic/provider. Please follow-up as instructed by your emergency provider today.  Return to the Emergency Department if:  Your chest pain changes, gets worse, starts to happen more often, or comes with less activity.  You are newly short of breath.  You get very weak or tired.  You pass out or faint.  You have any new symptoms, like fever, cough, numb legs, or you cough up blood.  You have anything else that worries you.    Until you follow-up with your regular provider, please do the following:  If you have questions, contact your regular provider.  Follow-up with your regular provider/clinic as directed; this is very important.    If you were given a prescription for medicine here today, be sure to read all of the information (including the package insert) that comes with your prescription.  This will include important information about the medicine, its side effects, and any warnings that you need to know about.  The pharmacist who fills the prescription can provide more information and answer questions you may have about the medicine.  If you have questions or concerns that the pharmacist cannot address, please call or return to the Emergency Department.       Remember that you can always come back to the Emergency Department if you are not able to see your regular provider in the amount of time listed above, if you get any new symptoms, or if  there is anything that worries you.     charge RN

## 2023-08-10 NOTE — ED ADULT TRIAGE NOTE - HEART RATE (BEATS/MIN)
84 Topical Ketoconazole Counseling: Patient counseled that this medication may cause skin irritation or allergic reactions.  In the event of skin irritation, the patient was advised to reduce the amount of the drug applied or use it less frequently.   The patient verbalized understanding of the proper use and possible adverse effects of ketoconazole.  All of the patient's questions and concerns were addressed.

## 2024-02-05 NOTE — ED ADULT NURSE NOTE - NS ED PATIENT SAFETY CONCERN
Physical Therapy    Patient not seen in therapy.     Attempted patient bedside for PM session; patient with physician and then receiving nebulizer treatment. Physical therapy will reattempt tomorrow. Thank you!       OBJECTIVE                         Therapy procedure time and total treatment time can be found documented on the Time Entry flowsheet   No

## 2024-04-25 NOTE — ED PROVIDER NOTE - INTERPRETATION
ASSESSMENT and PLAN  1. Vasovagal syncope  No recurrence.    2. Essential hypertension  Well-controlled.  No changes recommended.       Follow-up as needed.  The patient has been instructed and agrees to call our office with any issues or other concerns related to their cardiac condition(s) and/or complaint(s).      CHIEF COMPLAINT  Routine follow-up    HPI:    Venus Mcdaniel is a 71 y.o. female here for follow-up.  She was last seen in the office 1/13/2023 by Dr. Corbin.  She was stable and free of cardiac symptoms.  No acute cardiac issues have developed in the interim.  She denies chest pain, dyspnea, heart failure symptoms, palpitations, dizziness, lightheadedness or recurrent syncope.    PERTINENT CARDIAC HISTORY: (Records reviewed and summarized below)  Syncope  ==> [facility]iol[facility] 2016, EF 83%, no ischemia or infarction  ==> Echo 9/28/2016, EF 65%, 1+ AR, 1+ MR  ==> Holter 11/2016, rare PACs and PVCs  ==> 5-day Holter 11/2020, HR  bpm, occasional PACs and PVCs  ==> Echo 2/8/2021, EF 70%, valves nl, RV nl    Carotid artery disease  ==> Duplex 2/8/2021, <50% bilateral    Hypertension  Diabetes mellitus, type II  Hypothyroidism    Past medical history, past surgical history, family history, social history, and medications were all reviewed with the patient today and updated as necessary.     Current Outpatient Medications   Medication Sig    ASPIRIN PO Take 81 mg by mouth daily    MAGNESIUM PO Take 400 mg by mouth daily    ZINC PO Take 50 mg by mouth daily    ascorbic acid (VITAMIN C) 1000 MG tablet Take 1 tablet by mouth daily    Cholecalciferol 50 MCG (2000 UT) CAPS Take 1 capsule by mouth daily    dilTIAZem (TIAZAC) 120 MG extended release capsule Take 1 capsule by mouth daily    metFORMIN (GLUCOPHAGE) 500 MG tablet Take 1 tablet by mouth with breakfast and with evening meal    olmesartan (BENICAR) 20 MG tablet Take 1 tablet by mouth every evening    olmesartan-hydroCHLOROthiazide (BENICAR  normal sinus rhythm

## 2024-09-11 NOTE — DISCHARGE NOTE ADULT - PROVIDER RX CONTACT NUMBER
[phone] PT IE Completed, Pt amb 30ft with RW and CTGAx1, RN Grzegorz aware of intent to treat, Pt received in supine, +IV, +ice packs

## 2025-01-22 NOTE — CHART NOTE - NSCHARTNOTESELECT_GEN_ALL_CORE
Patient to infusion for venofer.  She tolerated treatment today. Next appointment confirmed for 1/29 1530, she declined AVS   ED Social Work Note

## 2025-07-01 NOTE — ED ADULT NURSE NOTE - OBJECTIVE STATEMENT
BIBA from GYN office. As per EMS, patient had elevated BP reading and low oxygen saturation at the office. Patient was at the office to be seen for a uterine prolapse. On arrival patient denies any shortness of breath, chest pain, headache or other discomfort. Noted to be mildly tachypneic and wheezes heard on lung auscultation. Patient states she breathes this was every day. When PA spoke via  to patient, patient states she was recommended by a physician to start using oxygen at home but she has not followed up. Reports hx of asthma and uses inhaler occasionally. Patient reports her BP meds were changed from amlodipine to a different med and her BP has been elevated compared to her previous BP's while on amlodipine. EKG done at bedside and oxygen 2LNC placed on patient. Patient denies any fever, chills, abdominal pain, nausea, vomiting or diarrhea. Skin is warm dry and intact. 02-Jul-2025 12:46